# Patient Record
Sex: MALE | Race: WHITE | NOT HISPANIC OR LATINO | URBAN - METROPOLITAN AREA
[De-identification: names, ages, dates, MRNs, and addresses within clinical notes are randomized per-mention and may not be internally consistent; named-entity substitution may affect disease eponyms.]

---

## 2018-08-24 ENCOUNTER — OUTPATIENT (OUTPATIENT)
Dept: OUTPATIENT SERVICES | Facility: HOSPITAL | Age: 68
LOS: 1 days | Discharge: HOME | End: 2018-08-24

## 2018-08-24 DIAGNOSIS — R05 COUGH: ICD-10-CM

## 2020-06-26 ENCOUNTER — OUTPATIENT (OUTPATIENT)
Dept: OUTPATIENT SERVICES | Facility: HOSPITAL | Age: 70
LOS: 1 days | Discharge: HOME | End: 2020-06-26

## 2020-06-26 ENCOUNTER — APPOINTMENT (OUTPATIENT)
Dept: HEMATOLOGY ONCOLOGY | Facility: CLINIC | Age: 70
End: 2020-06-26
Payer: MEDICARE

## 2020-06-26 ENCOUNTER — LABORATORY RESULT (OUTPATIENT)
Age: 70
End: 2020-06-26

## 2020-06-26 VITALS
SYSTOLIC BLOOD PRESSURE: 127 MMHG | TEMPERATURE: 97 F | HEIGHT: 66 IN | DIASTOLIC BLOOD PRESSURE: 70 MMHG | WEIGHT: 172 LBS | RESPIRATION RATE: 18 BRPM | BODY MASS INDEX: 27.64 KG/M2 | HEART RATE: 59 BPM

## 2020-06-26 DIAGNOSIS — Z80.0 FAMILY HISTORY OF MALIGNANT NEOPLASM OF DIGESTIVE ORGANS: ICD-10-CM

## 2020-06-26 DIAGNOSIS — Z86.39 PERSONAL HISTORY OF OTHER ENDOCRINE, NUTRITIONAL AND METABOLIC DISEASE: ICD-10-CM

## 2020-06-26 DIAGNOSIS — D69.6 THROMBOCYTOPENIA, UNSPECIFIED: ICD-10-CM

## 2020-06-26 DIAGNOSIS — E11.9 TYPE 2 DIABETES MELLITUS W/OUT COMPLICATIONS: ICD-10-CM

## 2020-06-26 DIAGNOSIS — Z82.0 FAMILY HISTORY OF EPILEPSY AND OTHER DISEASES OF THE NERVOUS SYSTEM: ICD-10-CM

## 2020-06-26 DIAGNOSIS — E04.9 NONTOXIC GOITER, UNSPECIFIED: ICD-10-CM

## 2020-06-26 DIAGNOSIS — Z83.3 FAMILY HISTORY OF DIABETES MELLITUS: ICD-10-CM

## 2020-06-26 DIAGNOSIS — Z78.9 OTHER SPECIFIED HEALTH STATUS: ICD-10-CM

## 2020-06-26 DIAGNOSIS — E78.00 PURE HYPERCHOLESTEROLEMIA, UNSPECIFIED: ICD-10-CM

## 2020-06-26 DIAGNOSIS — Z80.1 FAMILY HISTORY OF MALIGNANT NEOPLASM OF TRACHEA, BRONCHUS AND LUNG: ICD-10-CM

## 2020-06-26 DIAGNOSIS — Z82.49 FAMILY HISTORY OF ISCHEMIC HEART DISEASE AND OTHER DISEASES OF THE CIRCULATORY SYSTEM: ICD-10-CM

## 2020-06-26 DIAGNOSIS — Z87.891 PERSONAL HISTORY OF NICOTINE DEPENDENCE: ICD-10-CM

## 2020-06-26 LAB
ALBUMIN SERPL ELPH-MCNC: 4.5 G/DL
ALP BLD-CCNC: 53 U/L
ALT SERPL-CCNC: 13 U/L
ANION GAP SERPL CALC-SCNC: 13 MMOL/L
AST SERPL-CCNC: 14 U/L
BILIRUB SERPL-MCNC: 0.4 MG/DL
BUN SERPL-MCNC: 13 MG/DL
CALCIUM SERPL-MCNC: 9.5 MG/DL
CHLORIDE SERPL-SCNC: 103 MMOL/L
CO2 SERPL-SCNC: 26 MMOL/L
CREAT SERPL-MCNC: 0.8 MG/DL
GLUCOSE SERPL-MCNC: 91 MG/DL
HCT VFR BLD CALC: 44.3 %
HGB BLD-MCNC: 14.6 G/DL
LDH SERPL-CCNC: 143 U/L
MCHC RBC-ENTMCNC: 29.1 PG
MCHC RBC-ENTMCNC: 33 G/DL
MCV RBC AUTO: 88.2 FL
PLATELET # BLD AUTO: 81 K/UL
PMV BLD: 11.8 FL
POTASSIUM SERPL-SCNC: 4.8 MMOL/L
PROT SERPL-MCNC: 7.2 G/DL
RBC # BLD: 5.02 M/UL
RBC # FLD: 12.4 %
SODIUM SERPL-SCNC: 142 MMOL/L
WBC # FLD AUTO: 7.81 K/UL

## 2020-06-26 PROCEDURE — 99204 OFFICE O/P NEW MOD 45 MIN: CPT

## 2020-06-29 LAB
ANA SER IF-ACNC: NEGATIVE
RHEUMATOID FACT SER QL: <10 IU/ML

## 2020-06-30 LAB
CARDIOLIPIN AB SER IA-ACNC: NEGATIVE
PHOSPHOLIPDS: 190 MG/DL

## 2020-07-01 ENCOUNTER — LABORATORY RESULT (OUTPATIENT)
Age: 70
End: 2020-07-01

## 2020-07-01 ENCOUNTER — APPOINTMENT (OUTPATIENT)
Dept: HEMATOLOGY ONCOLOGY | Facility: CLINIC | Age: 70
End: 2020-07-01

## 2020-07-01 ENCOUNTER — OUTPATIENT (OUTPATIENT)
Dept: OUTPATIENT SERVICES | Facility: HOSPITAL | Age: 70
LOS: 1 days | Discharge: HOME | End: 2020-07-01

## 2020-07-01 DIAGNOSIS — Z00.00 ENCOUNTER FOR GENERAL ADULT MEDICAL EXAMINATION W/OUT ABNORMAL FINDINGS: ICD-10-CM

## 2020-07-01 DIAGNOSIS — D69.6 THROMBOCYTOPENIA, UNSPECIFIED: ICD-10-CM

## 2020-07-01 DIAGNOSIS — E04.9 NONTOXIC GOITER, UNSPECIFIED: ICD-10-CM

## 2020-07-01 LAB
HCT VFR BLD CALC: 42.1 %
HGB BLD-MCNC: 14.2 G/DL
MCHC RBC-ENTMCNC: 29.3 PG
MCHC RBC-ENTMCNC: 33.7 G/DL
MCV RBC AUTO: 86.8 FL
PLATELET # BLD AUTO: 79 K/UL
PMV BLD: 11.6 FL
RBC # BLD: 4.85 M/UL
RBC # FLD: 12.3 %
WBC # FLD AUTO: 7.88 K/UL

## 2020-07-02 DIAGNOSIS — D69.6 THROMBOCYTOPENIA, UNSPECIFIED: ICD-10-CM

## 2020-07-02 NOTE — CONSULT LETTER
[Dear  ___] : Dear  [unfilled], [Consult Letter:] : I had the pleasure of evaluating your patient, [unfilled]. [Please see my note below.] : Please see my note below. [Consult Closing:] : Thank you very much for allowing me to participate in the care of this patient.  If you have any questions, please do not hesitate to contact me. [Sincerely,] : Sincerely, [FreeTextEntry3] : Dr. CARMEL Branch [FreeTextEntry2] : Dr. Nam Byers

## 2020-07-02 NOTE — ASSESSMENT
[FreeTextEntry1] : Josafat Thompson is a 69 year WM referred for thrombocytopenia, asymptomatic, for evaluation prior to thyroid surgery. Given his past medical history of mild thrombocytopenia and no other hematological issues, we are dealing most likely with chronic ITP. Unfortunately, that's a diagnosis of exclusion.\par \par We will check labs today to r/o underlying conditions: ESTELITA, Rheumatoid factor, LDH, antiphospholipid, anticardiolipin antibodies, CBC with peripheral smear to rule out clumping. \par \par For us hematologists, a platelet count of 89 K should achieve a good hemostasis especially in the absence of any other coagulopathy. The patient will be cleared from hematological standpoint for thyroidectomy if the work up shows clumping and if no other diagnostic abnormality. However, if surgery insists on having a platelet count of 100 K or above, a short course of steroid may be administered.\par \par Further recommendations will be discussed once the above workup is complete. \par \par Case was seen and discussed with Dr. Branch who agreed with the assessment and plan.\par \par

## 2020-07-02 NOTE — RESULTS/DATA
[FreeTextEntry1] : Patient's blood work reviewed. WBC and H/H are within normal limits. Platelets, as mentioned, 89 K, on a recent hemogram.

## 2020-07-02 NOTE — HISTORY OF PRESENT ILLNESS
[Disease:__________________________] : Disease: [unfilled] [de-identified] : Josafat Thompson, a 69 year WM, is here today, upon the recommendation of his surgeon, for initial evaluation for thrombocytopenia. \par As per the patient, he has a h/o thrombocytopenia diagnosed about 4 years ago, asymptomatic, never requiring any treatment. He had regular follow up visits with his PCP and on exam he was noted to have an enlarged thyroid. He was evaluated by his endocrinologist. TSH, free T4 were within normal.  US of the thyroid revealed an enlarged thyroid Numerous biopsies which were obtained were all benign. \par The patient was scheduled for partial thyroidectomy on Monday, June 22nd. The PAST evaluation for surgery showed a low platelet count at 89 K. He was asked to have a hematological clearance however for surgery.\par \par The patient feels well. He denies bleeding, bruising, weakness, SOB, fatigue. Denies fever, night sweats or chills.  Denies chest pain, numbness, vision changes. The appetite is good.

## 2021-02-16 ENCOUNTER — LABORATORY RESULT (OUTPATIENT)
Age: 71
End: 2021-02-16

## 2021-02-16 ENCOUNTER — APPOINTMENT (OUTPATIENT)
Dept: HEMATOLOGY ONCOLOGY | Facility: CLINIC | Age: 71
End: 2021-02-16
Payer: MEDICARE

## 2021-02-16 VITALS — HEIGHT: 66 IN | BODY MASS INDEX: 27.64 KG/M2 | WEIGHT: 172 LBS | HEART RATE: 65 BPM

## 2021-02-16 LAB
HCT VFR BLD CALC: 39.2 %
HGB BLD-MCNC: 13.4 G/DL
MCHC RBC-ENTMCNC: 30.3 PG
MCHC RBC-ENTMCNC: 34.2 G/DL
MCV RBC AUTO: 88.7 FL
PLATELET # BLD AUTO: 64 K/UL
PMV BLD: 11.7 FL
RBC # BLD: 4.42 M/UL
RBC # FLD: 12.9 %
WBC # FLD AUTO: 7.62 K/UL

## 2021-02-16 PROCEDURE — 99213 OFFICE O/P EST LOW 20 MIN: CPT

## 2021-02-16 NOTE — ASSESSMENT
[FreeTextEntry1] : 1. Thrombocytopenia, asymptomatic, probably immune but bone marrow disorders cannot be ruled out.\par 2. Mild anemia, recent. That may also be part of the underlying process of #1 problem above or independent.\par \par The situation was discussed with the patient on the phone since he left, as recommended, after the blood was drawn. In addition to the CBC drawn today, we will also obtain a CMP.\par He will be monitored closely. The CBC will be repeated in a month. At that time, we will also order antiphospholipid antibodies since initially the lab did phospholipids instead of the antibodies. Previously, his work up was otherwise negative including anticardiolipin antibodies, ESTELITA, RF.\par Eventually, if those drops continue, he will need bone marrow studies.\par The patient was explained the reasons for the BM aspiration and biopsy.\par \par All his questions answered.

## 2021-02-16 NOTE — REASON FOR VISIT
[Follow-Up Visit] : a follow-up visit for [FreeTextEntry2] : Thrombocytopenia, lump at the left anterior axillary area

## 2021-02-16 NOTE — CONSULT LETTER
[Dear  ___] : Dear  [unfilled], [Courtesy Letter:] : I had the pleasure of seeing your patient, [unfilled], in my office today. [Please see my note below.] : Please see my note below. [Consult Closing:] : Thank you very much for allowing me to participate in the care of this patient.  If you have any questions, please do not hesitate to contact me. [Sincerely,] : Sincerely, [FreeTextEntry2] : Dr. Nam Byers [FreeTextEntry3] : Dr. CARMEL Branch

## 2021-02-16 NOTE — PHYSICAL EXAM
[Fully active, able to carry on all pre-disease performance without restriction] : Status 0 - Fully active, able to carry on all pre-disease performance without restriction [Normal] : affect appropriate [de-identified] : Anterior neck fullness [de-identified] : A soft tissue mass at the left axillary anterior line, movable, measuring may be 6-7 cm in the largest dimension, mostly flat with no firmer lesions felt. [de-identified] : Arthritic changes noted.

## 2021-02-16 NOTE — HISTORY OF PRESENT ILLNESS
[Disease:__________________________] : Disease: [unfilled] [de-identified] : The patient is coming for his regularly scheduled follow up for for his thrombocytopenia and a new mass at the left axillary are, on the anterior axillary line.\par The patient was in his usual state of health when he felt the mass himself. He had not paid attention to it until recently; therefore, it's not clear how long he had it.\par Otherwise, he is denying any fever or night sweats. No bleeding or bruising. No cough or shortness of breath. No new headache or dizziness.\par Eventually he had his thyroid biopsy and he was told that it was benign and no intervention required at this time.

## 2021-02-17 LAB
ALBUMIN SERPL ELPH-MCNC: 4 G/DL
ALP BLD-CCNC: 52 U/L
ALT SERPL-CCNC: 16 U/L
ANION GAP SERPL CALC-SCNC: 12 MMOL/L
AST SERPL-CCNC: 17 U/L
BILIRUB SERPL-MCNC: 0.2 MG/DL
BUN SERPL-MCNC: 14 MG/DL
CALCIUM SERPL-MCNC: 9 MG/DL
CHLORIDE SERPL-SCNC: 103 MMOL/L
CO2 SERPL-SCNC: 26 MMOL/L
CREAT SERPL-MCNC: 0.6 MG/DL
GLUCOSE SERPL-MCNC: 179 MG/DL
POTASSIUM SERPL-SCNC: 4.8 MMOL/L
PROT SERPL-MCNC: 6.7 G/DL
SODIUM SERPL-SCNC: 141 MMOL/L

## 2021-03-15 ENCOUNTER — LABORATORY RESULT (OUTPATIENT)
Age: 71
End: 2021-03-15

## 2021-03-15 ENCOUNTER — APPOINTMENT (OUTPATIENT)
Dept: HEMATOLOGY ONCOLOGY | Facility: CLINIC | Age: 71
End: 2021-03-15
Payer: MEDICARE

## 2021-03-15 ENCOUNTER — OUTPATIENT (OUTPATIENT)
Dept: OUTPATIENT SERVICES | Facility: HOSPITAL | Age: 71
LOS: 1 days | Discharge: HOME | End: 2021-03-15

## 2021-03-15 VITALS
HEIGHT: 66 IN | SYSTOLIC BLOOD PRESSURE: 140 MMHG | DIASTOLIC BLOOD PRESSURE: 67 MMHG | HEART RATE: 65 BPM | RESPIRATION RATE: 14 BRPM | BODY MASS INDEX: 27.97 KG/M2 | WEIGHT: 174 LBS | TEMPERATURE: 97.3 F

## 2021-03-15 DIAGNOSIS — D64.9 ANEMIA, UNSPECIFIED: ICD-10-CM

## 2021-03-15 DIAGNOSIS — D69.6 THROMBOCYTOPENIA, UNSPECIFIED: ICD-10-CM

## 2021-03-15 LAB
HCT VFR BLD CALC: 42.2 %
HGB BLD-MCNC: 14.4 G/DL
MCHC RBC-ENTMCNC: 29.8 PG
MCHC RBC-ENTMCNC: 34.1 G/DL
MCV RBC AUTO: 87.2 FL
PLATELET # BLD AUTO: 105 K/UL
PMV BLD: 10.8 FL
RBC # BLD: 4.84 M/UL
RBC # FLD: 13 %
WBC # FLD AUTO: 9.06 K/UL

## 2021-03-15 PROCEDURE — 99213 OFFICE O/P EST LOW 20 MIN: CPT

## 2021-03-16 LAB
ALBUMIN SERPL ELPH-MCNC: 4.4 G/DL
ALP BLD-CCNC: 52 U/L
ALT SERPL-CCNC: 13 U/L
ANION GAP SERPL CALC-SCNC: 9 MMOL/L
AST SERPL-CCNC: 16 U/L
BILIRUB SERPL-MCNC: 0.2 MG/DL
BUN SERPL-MCNC: 13 MG/DL
CALCIUM SERPL-MCNC: 9.7 MG/DL
CARDIOLIPIN AB SER IA-ACNC: NEGATIVE
CHLORIDE SERPL-SCNC: 103 MMOL/L
CO2 SERPL-SCNC: 27 MMOL/L
CREAT SERPL-MCNC: 0.7 MG/DL
GLUCOSE SERPL-MCNC: 89 MG/DL
LDH SERPL-CCNC: 167 U/L
POTASSIUM SERPL-SCNC: 5.1 MMOL/L
PROT SERPL-MCNC: 7 G/DL
SODIUM SERPL-SCNC: 139 MMOL/L

## 2021-03-16 NOTE — HISTORY OF PRESENT ILLNESS
[de-identified] : The patient is coming for a follow up for his thrombocytopenia thought to be immune since his work up was essentially negative.\par The patient is denying any new particular complaints.\par The slight soft protuberance at the left anterior axillary line has been stable. It had the characteristics of a lipoma.\par The patient is denying any bleeding or bruising.\par He is on no new medication.\par His thyroid biopsy has shown just a benign pathology.

## 2021-03-16 NOTE — PHYSICAL EXAM
[Fully active, able to carry on all pre-disease performance without restriction] : Status 0 - Fully active, able to carry on all pre-disease performance without restriction [Normal] : affect appropriate [de-identified] : Soft tissue preponderance at the left anterior axillary line is unchanged.

## 2021-03-16 NOTE — ASSESSMENT
[FreeTextEntry1] : 1. Thrombocytopenia, possibly immune, improved compared to February.\par 2. 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 Will observe.\par \par The situation was discussed with the patient. \par At this time will continue to observe without any "therapeutic" intervention but also will draw antiphospholipid antibodies since at his initial visit only phospholipids were drawn instead.\par \par All the above were discussed with the patient.\par

## 2021-03-17 LAB
B2 GLYCOPROT1 IGA SERPL IA-ACNC: <5 SAU
B2 GLYCOPROT1 IGG SER-ACNC: <5 SGU
B2 GLYCOPROT1 IGM SER-ACNC: 6.2 SMU
CARDIOLIPIN IGM SER-MCNC: 5 GPL
CARDIOLIPIN IGM SER-MCNC: 9.2 MPL

## 2021-03-19 LAB
PS IGA SER QL: 1
PS IGG SER QL: 5
PS IGM SER QL: 10

## 2021-03-23 DIAGNOSIS — D17.22 BENIGN LIPOMATOUS NEOPLASM OF SKIN AND SUBCUTANEOUS TISSUE OF LEFT ARM: ICD-10-CM

## 2022-07-13 NOTE — PHYSICAL EXAM
[Fully active, able to carry on all pre-disease performance without restriction] : Status 0 - Fully active, able to carry on all pre-disease performance without restriction [Normal] : pharynx is unremarkable, moist mucus membrane, no oral lesions [de-identified] : Enlarged thyroid almost diffusely, right lobe may be slightly more than the left. [de-identified] : n Cyclosporine Pregnancy And Lactation Text: This medication is Pregnancy Category C and it isn't know if it is safe during pregnancy. This medication is excreted in breast milk.

## 2023-06-06 ENCOUNTER — EMERGENCY (EMERGENCY)
Facility: HOSPITAL | Age: 73
LOS: 0 days | Discharge: HOME | End: 2023-06-06
Attending: EMERGENCY MEDICINE
Payer: MEDICARE

## 2023-06-06 VITALS
RESPIRATION RATE: 18 BRPM | HEART RATE: 62 BPM | DIASTOLIC BLOOD PRESSURE: 95 MMHG | SYSTOLIC BLOOD PRESSURE: 154 MMHG | HEIGHT: 68 IN | OXYGEN SATURATION: 98 % | WEIGHT: 169.98 LBS | TEMPERATURE: 97 F

## 2023-06-06 DIAGNOSIS — R91.1 SOLITARY PULMONARY NODULE: ICD-10-CM

## 2023-06-06 DIAGNOSIS — E11.9 TYPE 2 DIABETES MELLITUS WITHOUT COMPLICATIONS: ICD-10-CM

## 2023-06-06 DIAGNOSIS — R06.02 SHORTNESS OF BREATH: ICD-10-CM

## 2023-06-06 DIAGNOSIS — R07.89 OTHER CHEST PAIN: ICD-10-CM

## 2023-06-06 DIAGNOSIS — Z87.891 PERSONAL HISTORY OF NICOTINE DEPENDENCE: ICD-10-CM

## 2023-06-06 LAB
ALBUMIN SERPL ELPH-MCNC: 4.6 G/DL — SIGNIFICANT CHANGE UP (ref 3.5–5.2)
ALP SERPL-CCNC: 57 U/L — SIGNIFICANT CHANGE UP (ref 30–115)
ALT FLD-CCNC: 17 U/L — SIGNIFICANT CHANGE UP (ref 0–41)
ANION GAP SERPL CALC-SCNC: 11 MMOL/L — SIGNIFICANT CHANGE UP (ref 7–14)
AST SERPL-CCNC: 16 U/L — SIGNIFICANT CHANGE UP (ref 0–41)
BASOPHILS # BLD AUTO: 0.06 K/UL — SIGNIFICANT CHANGE UP (ref 0–0.2)
BASOPHILS NFR BLD AUTO: 0.7 % — SIGNIFICANT CHANGE UP (ref 0–1)
BILIRUB SERPL-MCNC: 0.3 MG/DL — SIGNIFICANT CHANGE UP (ref 0.2–1.2)
BUN SERPL-MCNC: 12 MG/DL — SIGNIFICANT CHANGE UP (ref 10–20)
CALCIUM SERPL-MCNC: 9.9 MG/DL — SIGNIFICANT CHANGE UP (ref 8.4–10.5)
CHLORIDE SERPL-SCNC: 105 MMOL/L — SIGNIFICANT CHANGE UP (ref 98–110)
CO2 SERPL-SCNC: 26 MMOL/L — SIGNIFICANT CHANGE UP (ref 17–32)
CREAT SERPL-MCNC: 0.7 MG/DL — SIGNIFICANT CHANGE UP (ref 0.7–1.5)
D DIMER BLD IA.RAPID-MCNC: 1020 NG/ML DDU — HIGH
EGFR: 97 ML/MIN/1.73M2 — SIGNIFICANT CHANGE UP
EOSINOPHIL # BLD AUTO: 0.17 K/UL — SIGNIFICANT CHANGE UP (ref 0–0.7)
EOSINOPHIL NFR BLD AUTO: 1.9 % — SIGNIFICANT CHANGE UP (ref 0–8)
GLUCOSE SERPL-MCNC: 111 MG/DL — HIGH (ref 70–99)
HCT VFR BLD CALC: 42.8 % — SIGNIFICANT CHANGE UP (ref 42–52)
HGB BLD-MCNC: 14.8 G/DL — SIGNIFICANT CHANGE UP (ref 14–18)
IMM GRANULOCYTES NFR BLD AUTO: 0.2 % — SIGNIFICANT CHANGE UP (ref 0.1–0.3)
LIDOCAIN IGE QN: 35 U/L — SIGNIFICANT CHANGE UP (ref 7–60)
LYMPHOCYTES # BLD AUTO: 1.71 K/UL — SIGNIFICANT CHANGE UP (ref 1.2–3.4)
LYMPHOCYTES # BLD AUTO: 19.3 % — LOW (ref 20.5–51.1)
MCHC RBC-ENTMCNC: 29.8 PG — SIGNIFICANT CHANGE UP (ref 27–31)
MCHC RBC-ENTMCNC: 34.6 G/DL — SIGNIFICANT CHANGE UP (ref 32–37)
MCV RBC AUTO: 86.1 FL — SIGNIFICANT CHANGE UP (ref 80–94)
MONOCYTES # BLD AUTO: 0.63 K/UL — HIGH (ref 0.1–0.6)
MONOCYTES NFR BLD AUTO: 7.1 % — SIGNIFICANT CHANGE UP (ref 1.7–9.3)
NEUTROPHILS # BLD AUTO: 6.25 K/UL — SIGNIFICANT CHANGE UP (ref 1.4–6.5)
NEUTROPHILS NFR BLD AUTO: 70.8 % — SIGNIFICANT CHANGE UP (ref 42.2–75.2)
NRBC # BLD: 0 /100 WBCS — SIGNIFICANT CHANGE UP (ref 0–0)
PLATELET # BLD AUTO: 96 K/UL — LOW (ref 130–400)
PMV BLD: 11.5 FL — HIGH (ref 7.4–10.4)
POTASSIUM SERPL-MCNC: 4.5 MMOL/L — SIGNIFICANT CHANGE UP (ref 3.5–5)
POTASSIUM SERPL-SCNC: 4.5 MMOL/L — SIGNIFICANT CHANGE UP (ref 3.5–5)
PROT SERPL-MCNC: 7.3 G/DL — SIGNIFICANT CHANGE UP (ref 6–8)
RBC # BLD: 4.97 M/UL — SIGNIFICANT CHANGE UP (ref 4.7–6.1)
RBC # FLD: 12.2 % — SIGNIFICANT CHANGE UP (ref 11.5–14.5)
SODIUM SERPL-SCNC: 142 MMOL/L — SIGNIFICANT CHANGE UP (ref 135–146)
TROPONIN T SERPL-MCNC: <0.01 NG/ML — SIGNIFICANT CHANGE UP
TROPONIN T SERPL-MCNC: <0.01 NG/ML — SIGNIFICANT CHANGE UP
WBC # BLD: 8.84 K/UL — SIGNIFICANT CHANGE UP (ref 4.8–10.8)
WBC # FLD AUTO: 8.84 K/UL — SIGNIFICANT CHANGE UP (ref 4.8–10.8)

## 2023-06-06 PROCEDURE — 83690 ASSAY OF LIPASE: CPT

## 2023-06-06 PROCEDURE — 71045 X-RAY EXAM CHEST 1 VIEW: CPT | Mod: 26

## 2023-06-06 PROCEDURE — 71275 CT ANGIOGRAPHY CHEST: CPT | Mod: MA

## 2023-06-06 PROCEDURE — 71045 X-RAY EXAM CHEST 1 VIEW: CPT

## 2023-06-06 PROCEDURE — 80053 COMPREHEN METABOLIC PANEL: CPT

## 2023-06-06 PROCEDURE — 36415 COLL VENOUS BLD VENIPUNCTURE: CPT

## 2023-06-06 PROCEDURE — 93005 ELECTROCARDIOGRAM TRACING: CPT

## 2023-06-06 PROCEDURE — 84484 ASSAY OF TROPONIN QUANT: CPT | Mod: 59

## 2023-06-06 PROCEDURE — 99285 EMERGENCY DEPT VISIT HI MDM: CPT | Mod: FS

## 2023-06-06 PROCEDURE — 71275 CT ANGIOGRAPHY CHEST: CPT | Mod: 26,MA

## 2023-06-06 PROCEDURE — 93010 ELECTROCARDIOGRAM REPORT: CPT

## 2023-06-06 PROCEDURE — 85379 FIBRIN DEGRADATION QUANT: CPT

## 2023-06-06 PROCEDURE — 99285 EMERGENCY DEPT VISIT HI MDM: CPT | Mod: 25

## 2023-06-06 PROCEDURE — 85025 COMPLETE CBC W/AUTO DIFF WBC: CPT

## 2023-06-06 NOTE — ED ADULT TRIAGE NOTE - CHIEF COMPLAINT QUOTE
Pt states " I am having cheat pain and shortness of breath that started months ago. Today its lasting longer than usual"

## 2023-06-06 NOTE — ED PROVIDER NOTE - ATTENDING APP SHARED VISIT CONTRIBUTION OF CARE
Patient past medical history of diabetes here with right-sided chest pressure associated shortness of breath.  Patient feels like he cannot catch his breath.  No fevers no chills.  No abdominal pain.  No vomiting.  No diaphoresis.  No other complaints.  Patient reports he has had the symptoms on and off for the past few months but today symptoms were worse than usual and did not go away.  No other complaints.  Patient is awake alert.  Abdomen soft nontender.  Patient breathing comfortably.  No calf tenderness.  Plan: Labs, EKG, chest x-ray, reassess.

## 2023-06-06 NOTE — ED ADULT NURSE NOTE - OBJECTIVE STATEMENT
patient states he's been experiencing chest discomfort and shortness of breath for months, today it is lasting longer than usual.

## 2023-06-06 NOTE — ED PROVIDER NOTE - OBJECTIVE STATEMENT
71 y/o male with hx of NIDDM, former smoker presents to the ED for evaluation of right sided chest pain and sob over past few months. no weight loss, hemoptysis. no back pain . no neck pain . no rashes. patient denies any calf pain or hemoptysis

## 2023-06-06 NOTE — ED ADULT NURSE NOTE - NSFALLUNIVINTERV_ED_ALL_ED
Bed/Stretcher in lowest position, wheels locked, appropriate side rails in place/Call bell, personal items and telephone in reach/Instruct patient to call for assistance before getting out of bed/chair/stretcher/Non-slip footwear applied when patient is off stretcher/[place] to call system/Physically safe environment - no spills, clutter or unnecessary equipment/Purposeful proactive rounding/Room/bathroom lighting operational, light cord in reach

## 2023-06-06 NOTE — ED PROVIDER NOTE - NSFOLLOWUPINSTRUCTIONS_ED_ALL_ED_FT
Our Emergency Department Referral Coordinators will be reaching out to you in the next 24-48 hours from 9:00am to 5:00pm with a follow up appointment. Please expect a phone call from the hospital in that time frame. If you do not receive a call or if you have any questions or concerns, you can reach them at   [phone]        Chest Pain    Chest pain can be caused by many different conditions which may or may not be dangerous. Causes include heartburn, lung infections, heart attack, blood clot in lungs, skin infections, strain or damage to muscle, cartilage, or bones, etc. In addition to a history and physical examination, an electrocardiogram (ECG) or other lab tests may have been performed to determine the cause of your chest pain. Follow up with your primary care provider or with a cardiologist as instructed.     SEEK IMMEDIATE MEDICAL CARE IF YOU HAVE ANY OF THE FOLLOWING SYMPTOMS: worsening chest pain, coughing up blood, unexplained back/neck/jaw pain, severe abdominal pain, dizziness or lightheadedness, fainting, shortness of breath, sweaty or clammy skin, vomiting, or racing heart beat. These symptoms may represent a serious problem that is an emergency. Do not wait to see if the symptoms will go away. Get medical help right away. Call 911 and do not drive yourself to the hospital.

## 2023-06-06 NOTE — ED PROVIDER NOTE - PROGRESS NOTE DETAILS
I informed patient of pulmonary nodule on CT and need for follow-up with pulmonary.  Patient also follow-up with cardiology as outpatient.  Patient comfortable with plan.  Patient with symptoms for the past few months.  H&P not consistent with acute coronary syndrome. I called patient to follow-up.  Patient reports he is feeling better today.  Symptoms are improving.  I also informed patient of his platelet number.  Patient reports that he already follows up with a hematologist and is aware of the fact that he has low platelets.  Patient is going to follow-up with cardiology and pulmonology.

## 2023-06-06 NOTE — ED PROVIDER NOTE - BIRTH SEX
Per verbal order read back by Dr. Odonnell patient can take ibuprofen 600 mg ibuprofen every 6 hours for pain.    Parvin Vinson RN on 3/29/2022 at 4:24 PM     Male

## 2023-06-06 NOTE — ED PROVIDER NOTE - PHYSICAL EXAMINATION
Vital Signs: I have reviewed the initial vital signs.  Constitutional: well-nourished, no acute distress, normocephalic  Eyes: PERRLA, EOMI, clear conjunctiva  ENT: MMM  Cardiovascular: regular rate, regular rhythm, no murmur appreciated  Respiratory: unlabored respiratory effort, clear to auscultation bilaterally, no chest wall tenderness  Gastrointestinal: soft, non-tender, non-distended  abdomen, no pulsatile mass  Musculoskeletal: supple neck, no lower extremity edema, no bony tenderness  Integumentary: warm, dry, no rash  Neurologic: awake, alert, cranial nerves II-XII grossly intact, extremities’ motor and sensory functions grossly intact, no focal deficits,

## 2023-06-06 NOTE — ED PROVIDER NOTE - PATIENT PORTAL LINK FT
You can access the FollowMyHealth Patient Portal offered by [facility] by registering at the following website: [email or web address]. By joining [employer]’s FollowMyHealth portal, you will also be able to view your health information using other applications (apps) compatible with our system.

## 2023-06-06 NOTE — ED PROVIDER NOTE - CLINICAL SUMMARY MEDICAL DECISION MAKING FREE TEXT BOX
72-year-old male presents with right-sided chest discomfort for many months.  Patient reports symptoms associated with shortness of breath.  No fevers no chills.  No nausea or vomiting.  No diaphoresis.  No abdominal pain.  No back pain.  Pain is nonradiating.  No other complaints.  Patient is nontoxic well-appearing in the ED.  Labs and imaging reviewed.  CTA negative for PE.  Troponin negative x2.  EKG reviewed.  Patient has had symptoms for many months.  Patient comfortable with follow-up as an outpatient.  Patient and importance of follow-up with cardiology.  I also informed patient of CT finding of pulmonary nodule and need to follow-up with pulmonary for this.  Patient understood.  Patient will follow-up with cardiology and pulmonology.

## 2023-11-24 ENCOUNTER — EMERGENCY (EMERGENCY)
Facility: HOSPITAL | Age: 73
LOS: 0 days | Discharge: HOME | End: 2023-11-24
Attending: EMERGENCY MEDICINE
Payer: MEDICARE

## 2023-11-24 VITALS
HEART RATE: 83 BPM | DIASTOLIC BLOOD PRESSURE: 77 MMHG | OXYGEN SATURATION: 98 % | RESPIRATION RATE: 18 BRPM | SYSTOLIC BLOOD PRESSURE: 159 MMHG | TEMPERATURE: 98 F | WEIGHT: 175.05 LBS

## 2023-11-24 DIAGNOSIS — S90.01XA CONTUSION OF RIGHT ANKLE, INITIAL ENCOUNTER: ICD-10-CM

## 2023-11-24 DIAGNOSIS — M25.571 PAIN IN RIGHT ANKLE AND JOINTS OF RIGHT FOOT: ICD-10-CM
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
## 2023-11-24 PROCEDURE — 29505 APPLICATION LONG LEG SPLINT: CPT | Mod: RT

## 2023-11-24 PROCEDURE — 73630 X-RAY EXAM OF FOOT: CPT | Mod: 26,RT

## 2023-11-24 PROCEDURE — 73610 X-RAY EXAM OF ANKLE: CPT | Mod: 26,RT

## 2023-11-24 PROCEDURE — 99284 EMERGENCY DEPT VISIT MOD MDM: CPT | Mod: 25

## 2023-11-24 PROCEDURE — 99284 EMERGENCY DEPT VISIT MOD MDM: CPT | Mod: FS,25

## 2023-11-24 PROCEDURE — 29515 APPLICATION SHORT LEG SPLINT: CPT | Mod: RT

## 2023-11-24 PROCEDURE — 73630 X-RAY EXAM OF FOOT: CPT | Mod: RT

## 2023-11-24 PROCEDURE — 73610 X-RAY EXAM OF ANKLE: CPT | Mod: RT

## 2023-11-24 NOTE — ED PROVIDER NOTE - PHYSICAL EXAMINATION
Physical Exam    Vital Signs: I have reviewed the initial vital signs.  Constitutional: appears stated age, no acute distress  Eyes: Sclera clear, EOMI.  Cardiovascular: S1 and S2, regular rate, regular rhythm, well-perfused extremities, radial pulses equal and 2+, pedal pulses 2+ and equal  Respiratory: unlabored respiratory effort, clear to auscultation bilaterally no wheezing, rales, or rhonchi  Gastrointestinal:  abdomen soft, non-tender, no pulsatile mass, bowel sounds within normal limits  Musculoskeletal: supple neck, mild tenderness to right medial malleolus, no tenderness to palpation to right fibular head, right lateral malleolus, right calcaneus/heel, right 5th metatarsal. full range of motion right ankle, right knee, right hip.   Integumentary: warm, dry, no rash  Neurologic: awake, alert, oriented x3, extremities’ motor and sensory functions grossly intact

## 2023-11-24 NOTE — ED PROVIDER NOTE - ATTENDING APP SHARED VISIT CONTRIBUTION OF CARE
73-year-old male presents for evaluation of injury to right ankle sustained earlier today.  Patient states he was on a ladder hanging up [place] lights when the ladder went down causing him to fall landing on his feet.  Patient states he was able to finishing the [place] lights however pain persisted and is worse with weightbearing.  Patient applied compressive wrap and came to the ED.  No head injury, no neck or back pain.  On exam patient in NAD, AAOx3, GCS 15, no midline vertebral tenderness, knee nontender, hips nontender, no fibular head tenderness, positive swelling to right ankle with ecchymosis to the medial aspect, no fifth metatarsal tenderness, skin is intact, positive DP pulses

## 2023-11-24 NOTE — ED PROVIDER NOTE - CLINICAL SUMMARY MEDICAL DECISION MAKING FREE TEXT BOX
X-ray obtained and interpreted by me.  No acute fracture or dislocation seen.  Results were discussed with patient.  Splint applied.  Recommend RICE.  Patient will need to follow-up with orthopedics.

## 2023-11-24 NOTE — ED PROVIDER NOTE - NSFOLLOWUPINSTRUCTIONS_ED_ALL_ED_FT
Our Emergency Department Referral Coordinators will be reaching out to you in the next 24-48 hours from 9:00am to 5:00pm with a follow up appointment. Please expect a phone call from the hospital in that time frame. If you do not receive a call or if you have any questions or concerns, you can reach them at   [phone].    Ankle Pain  The ankle joint holds your body weight and allows you to move around. Ankle pain can occur on either side or the back of one ankle or both ankles. Ankle pain may be sharp and burning or dull and aching. There may be tenderness, stiffness, redness, or warmth around the ankle. Many things can cause ankle pain, including an injury to the area and overuse of the ankle.    Follow these instructions at home:  Activity    Rest your ankle as told by your health care provider. Avoid any activities that cause ankle pain.  Do not use the injured limb to support your body weight until your health care provider says that you can. Use crutches as told by your health care provider.  Do exercises as told by your health care provider.  Ask your health care provider when it is safe to drive if you have a brace on your ankle.  If you have a brace:    Wear the brace as told by your health care provider. Remove it only as told by your health care provider.  Loosen the brace if your toes tingle, become numb, or turn cold and blue.  Keep the brace clean.  If the brace is not waterproof:  Do not let it get wet.  Cover it with a watertight covering when you take a bath or shower.  If you were given an elastic bandage:    A person wrapping a bandage around an ankle.  Remove it when you take a bath or a shower.  Try not to move your ankle very much, but wiggle your toes from time to time. This helps to prevent swelling.  Adjust the bandage to make it more comfortable if it feels too tight.  Loosen the bandage if you have numbness or tingling in your foot or if your foot turns cold and blue.  Managing pain, stiffness, and swelling    Bag of ice on a towel on the skin.  If directed, put ice on the painful area.  If you have a removable brace or elastic bandage, remove it as told by your health care provider.  Put ice in a plastic bag.  Place a towel between your skin and the bag.  Leave the ice on for 20 minutes, 2–3 times a day.  Move your toes often to avoid stiffness and to lessen swelling.  Raise (elevate) your ankle above the level of your heart while you are sitting or lying down.  General instructions    Record information about your pain. Writing down the following may be helpful for you and your health care provider:  How often you have ankle pain.  Where the pain is located.  What the pain feels like.  If treatment involves wearing a prescribed shoe or insole, make sure you wear it correctly and for as long as told by your health care provider.  Take over-the-counter and prescription medicines only as told by your health care provider.  Keep all follow-up visits as told by your health care provider. This is important.  Contact a health care provider if:  Your pain gets worse.  Your pain is not relieved with medicines.  You have a fever or chills.  You are having more trouble with walking.  You have new symptoms.  Get help right away if:  Your foot, leg, toes, or ankle:  Tingles or becomes numb.  Becomes swollen.  Turns pale or blue.  Summary  Ankle pain can occur on either side or the back of one ankle or both ankles.  Ankle pain may be sharp and burning or dull and aching.  Rest your ankle as told by your health care provider. If told, apply ice to the area.  Take over-the-counter and prescription medicines only as told by your health care provider.

## 2023-11-24 NOTE — ED PROVIDER NOTE - PATIENT PORTAL LINK FT
You can access the FollowMyHealth Patient Portal offered by [facility] by registering at the following website: [email or web address]. By joining [employer] [facility]’s FollowMyHealth portal, you will also be able to view your health information using other applications (apps) compatible with our system.

## 2023-11-24 NOTE — ED PROVIDER NOTE - OBJECTIVE STATEMENT
73-year-old male denies significant medical history presents with complaint of right ankle pain.  Reports he was working on a ladder with protective work boots when he slipped and fell down several steps, landing on bilateral feet.  Endorses subsequent mild pain to right ankle medial aspect. States he was able to bear weight on bilateral lower extremities after the injury, but pain increased with weightbearing. Denies other injuries/head trauma, chest pain, shortness of breath, abdominal pain, numbness or  tingling, lightheadedness, weakness.

## 2023-11-25 ENCOUNTER — APPOINTMENT (OUTPATIENT)
Dept: ORTHOPEDIC SURGERY | Facility: CLINIC | Age: 73
End: 2023-11-25
Payer: MEDICARE

## 2023-11-25 VITALS — BODY MASS INDEX: 25.76 KG/M2 | HEIGHT: 68 IN | WEIGHT: 170 LBS

## 2023-11-25 DIAGNOSIS — S93.401A SPRAIN OF UNSPECIFIED LIGAMENT OF RIGHT ANKLE, INITIAL ENCOUNTER: ICD-10-CM

## 2023-11-25 PROBLEM — E11.9 TYPE 2 DIABETES MELLITUS WITHOUT COMPLICATIONS: Chronic | Status: ACTIVE | Noted: 2023-06-06

## 2023-11-25 PROCEDURE — 99203 OFFICE O/P NEW LOW 30 MIN: CPT | Mod: 25

## 2023-11-25 PROCEDURE — L4361: CPT | Mod: KX,RT

## 2023-12-14 ENCOUNTER — APPOINTMENT (OUTPATIENT)
Dept: ORTHOPEDIC SURGERY | Facility: CLINIC | Age: 73
End: 2023-12-14

## 2024-04-25 ENCOUNTER — APPOINTMENT (OUTPATIENT)
Dept: SURGERY | Facility: CLINIC | Age: 74
End: 2024-04-25
Payer: MEDICARE

## 2024-04-25 VITALS — WEIGHT: 170 LBS | HEIGHT: 68 IN | BODY MASS INDEX: 25.76 KG/M2

## 2024-04-25 DIAGNOSIS — S76.211A STRAIN OF ADDUCTOR MUSCLE, FASCIA AND TENDON OF RIGHT THIGH, INITIAL ENCOUNTER: ICD-10-CM

## 2024-04-25 PROCEDURE — 99204 OFFICE O/P NEW MOD 45 MIN: CPT

## 2024-04-25 PROCEDURE — 99214 OFFICE O/P EST MOD 30 MIN: CPT

## 2024-04-29 NOTE — CONSULT LETTER
[Dear  ___] : Dear  [unfilled], [Courtesy Letter:] : I had the pleasure of seeing your patient, [unfilled], in my office today. [Please see my note below.] : Please see my note below. [Consult Closing:] : Thank you very much for allowing me to participate in the care of this patient.  If you have any questions, please do not hesitate to contact me. [Sincerely,] : Sincerely, [FreeTextEntry3] :     Josie Gustafson PA-C, MSPAS

## 2024-04-29 NOTE — PHYSICAL EXAM
[Respiratory Effort] : normal respiratory effort [No Rash or Lesion] : No rash or lesion [Alert] : alert [Calm] : calm [JVD] : no jugular venous distention  [de-identified] : healthy [de-identified] : normal [de-identified] : mildly protuberant abdomen [de-identified] : incarcerated umbilical hernia, no inguinal hernia recurrence

## 2024-04-29 NOTE — ASSESSMENT
[FreeTextEntry1] : Josafat is a pleasant 73 year old male with past medical history significant for HTN, hypercholesterolemia, diabetes (stated recent A1C 6.5), enlarged thyroid s/p recent biopsy pending results, and a bilateral inguinal hernia repairs with Dr. Colin in 2015 and 2016 who presents to the office complaining of 2 weeks of right groin discomfort several centimeters above his incision.  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  Physical examination demonstrates a well-healed scar with no evidence of hernia recurrence or delayed wound complications in the left inguinal region. Physical examination of the right inguinal region demonstrates some mild tenderness to deep palpation overlying the inguinal canal with no evidence of hernia recurrence or fascial defect. There is no evidence of muscular hematoma or any significant abdominal pathology. His abdomen is soft and nontender with no signs of peritonitis.  His umbilical examination demonstrates a walnut sized slightly tender bulge which is only partially reducible, consistent with an incarcerated umbilical hernia warranting surgical repair.  His current BMI is 26.  Josafat was counseled and reassured. In regard to his right groin discomfort, I believe he sustained a significant muscle strain due to overexertion and I recommended alternating ice/heat therapy and nonsteroidal anti-inflammatory medication for the next few weeks. In regard to his umbilical hernia, I explained the pros and cons of surgery, as well as all risks, benefits, indications and alternatives of the procedure and the patient understood and agreed. Josafat was scheduled for the repair of his incarcerated umbilical hernia with mesh on Monday, Adilia 3, 2024, under LOCAL with IV SEDATION at [facility] with presurgical testing waived. He was encouraged to avoid heavy lifting and strenuous activity in the interim, of course.  Niacinamide Pregnancy And Lactation Text: These medications are considered safe during pregnancy.

## 2024-05-30 ENCOUNTER — NON-APPOINTMENT (OUTPATIENT)
Age: 74
End: 2024-05-30

## 2024-05-31 NOTE — ASU PATIENT PROFILE, ADULT - AS SC BRADEN SENSORY
(4) no impairment Tremfya Counseling: I discussed with the patient the risks of guselkumab including but not limited to immunosuppression, serious infections, worsening of inflammatory bowel disease and drug reactions.  The patient understands that monitoring is required including a PPD at baseline and must alert us or the primary physician if symptoms of infection or other concerning signs are noted.

## 2024-06-03 ENCOUNTER — TRANSCRIPTION ENCOUNTER (OUTPATIENT)
Age: 74
End: 2024-06-03

## 2024-06-03 ENCOUNTER — OUTPATIENT (OUTPATIENT)
Dept: OUTPATIENT SERVICES | Facility: HOSPITAL | Age: 74
LOS: 1 days | Discharge: HOME | End: 2024-06-03
Payer: MEDICARE

## 2024-06-03 ENCOUNTER — APPOINTMENT (OUTPATIENT)
Dept: SURGERY | Facility: AMBULATORY SURGERY CENTER | Age: 74
End: 2024-06-03
Payer: MEDICARE

## 2024-06-03 VITALS
TEMPERATURE: 98 F | RESPIRATION RATE: 20 BRPM | WEIGHT: 169.98 LBS | HEIGHT: 68 IN | OXYGEN SATURATION: 99 % | HEART RATE: 49 BPM | DIASTOLIC BLOOD PRESSURE: 68 MMHG | SYSTOLIC BLOOD PRESSURE: 156 MMHG

## 2024-06-03 VITALS
RESPIRATION RATE: 18 BRPM | SYSTOLIC BLOOD PRESSURE: 162 MMHG | OXYGEN SATURATION: 99 % | DIASTOLIC BLOOD PRESSURE: 82 MMHG | HEART RATE: 49 BPM

## 2024-06-03 DIAGNOSIS — K42.0 UMBILICAL HERNIA WITH OBSTRUCTION, WITHOUT GANGRENE: ICD-10-CM

## 2024-06-03 DIAGNOSIS — Z98.890 OTHER SPECIFIED POSTPROCEDURAL STATES: Chronic | ICD-10-CM

## 2024-06-03 LAB
ALBUMIN SERPL ELPH-MCNC: 4.5 G/DL — SIGNIFICANT CHANGE UP (ref 3.5–5.2)
ALP SERPL-CCNC: 53 U/L — SIGNIFICANT CHANGE UP (ref 30–115)
ALT FLD-CCNC: 11 U/L — SIGNIFICANT CHANGE UP (ref 0–41)
ANION GAP SERPL CALC-SCNC: 8 MMOL/L — SIGNIFICANT CHANGE UP (ref 7–14)
ANION GAP SERPL CALC-SCNC: 8 MMOL/L — SIGNIFICANT CHANGE UP (ref 7–14)
AST SERPL-CCNC: 13 U/L — SIGNIFICANT CHANGE UP (ref 0–41)
BILIRUB SERPL-MCNC: 0.2 MG/DL — SIGNIFICANT CHANGE UP (ref 0.2–1.2)
BUN SERPL-MCNC: 11 MG/DL — SIGNIFICANT CHANGE UP (ref 10–20)
BUN SERPL-MCNC: 11 MG/DL — SIGNIFICANT CHANGE UP (ref 10–20)
CALCIUM SERPL-MCNC: 9.9 MG/DL — SIGNIFICANT CHANGE UP (ref 8.4–10.5)
CALCIUM SERPL-MCNC: 9.9 MG/DL — SIGNIFICANT CHANGE UP (ref 8.4–10.5)
CHLORIDE SERPL-SCNC: 104 MMOL/L — SIGNIFICANT CHANGE UP (ref 98–110)
CHLORIDE SERPL-SCNC: 104 MMOL/L — SIGNIFICANT CHANGE UP (ref 98–110)
CO2 SERPL-SCNC: 27 MMOL/L — SIGNIFICANT CHANGE UP (ref 17–32)
CO2 SERPL-SCNC: 27 MMOL/L — SIGNIFICANT CHANGE UP (ref 17–32)
CREAT SERPL-MCNC: 0.6 MG/DL — LOW (ref 0.7–1.5)
CREAT SERPL-MCNC: 0.6 MG/DL — LOW (ref 0.7–1.5)
EGFR: 101 ML/MIN/1.73M2 — SIGNIFICANT CHANGE UP
EGFR: 101 ML/MIN/1.73M2 — SIGNIFICANT CHANGE UP
GLUCOSE SERPL-MCNC: 122 MG/DL — HIGH (ref 70–99)
GLUCOSE SERPL-MCNC: 122 MG/DL — HIGH (ref 70–99)
HCT VFR BLD CALC: 41.2 % — LOW (ref 42–52)
HGB BLD-MCNC: 14 G/DL — SIGNIFICANT CHANGE UP (ref 14–18)
MCHC RBC-ENTMCNC: 29.8 PG — SIGNIFICANT CHANGE UP (ref 27–31)
MCHC RBC-ENTMCNC: 34 G/DL — SIGNIFICANT CHANGE UP (ref 32–37)
MCV RBC AUTO: 87.7 FL — SIGNIFICANT CHANGE UP (ref 80–94)
NRBC # BLD: 0 /100 WBCS — SIGNIFICANT CHANGE UP (ref 0–0)
PLATELET # BLD AUTO: 80 K/UL — LOW (ref 130–400)
PMV BLD: 11.9 FL — HIGH (ref 7.4–10.4)
POTASSIUM SERPL-MCNC: 4.9 MMOL/L — SIGNIFICANT CHANGE UP (ref 3.5–5)
POTASSIUM SERPL-MCNC: 4.9 MMOL/L — SIGNIFICANT CHANGE UP (ref 3.5–5)
POTASSIUM SERPL-SCNC: 4.9 MMOL/L — SIGNIFICANT CHANGE UP (ref 3.5–5)
POTASSIUM SERPL-SCNC: 4.9 MMOL/L — SIGNIFICANT CHANGE UP (ref 3.5–5)
PROT SERPL-MCNC: 6.7 G/DL — SIGNIFICANT CHANGE UP (ref 6–8)
RBC # BLD: 4.7 M/UL — SIGNIFICANT CHANGE UP (ref 4.7–6.1)
RBC # FLD: 12 % — SIGNIFICANT CHANGE UP (ref 11.5–14.5)
SODIUM SERPL-SCNC: 139 MMOL/L — SIGNIFICANT CHANGE UP (ref 135–146)
SODIUM SERPL-SCNC: 139 MMOL/L — SIGNIFICANT CHANGE UP (ref 135–146)
WBC # BLD: 7.38 K/UL — SIGNIFICANT CHANGE UP (ref 4.8–10.8)
WBC # FLD AUTO: 7.38 K/UL — SIGNIFICANT CHANGE UP (ref 4.8–10.8)

## 2024-06-03 PROCEDURE — 80048 BASIC METABOLIC PNL TOTAL CA: CPT

## 2024-06-03 PROCEDURE — 36415 COLL VENOUS BLD VENIPUNCTURE: CPT

## 2024-06-03 PROCEDURE — 49594 RPR AA HRN 1ST 3-10 NCR/STRN: CPT

## 2024-06-03 PROCEDURE — 99223 1ST HOSP IP/OBS HIGH 75: CPT

## 2024-06-03 PROCEDURE — 74174 CTA ABD&PLVS W/CONTRAST: CPT | Mod: MC

## 2024-06-03 PROCEDURE — 93979 VASCULAR STUDY: CPT

## 2024-06-03 PROCEDURE — 80053 COMPREHEN METABOLIC PANEL: CPT

## 2024-06-03 PROCEDURE — 85027 COMPLETE CBC AUTOMATED: CPT

## 2024-06-03 RX ORDER — ONDANSETRON 8 MG/1
4 TABLET, FILM COATED ORAL ONCE
Refills: 0 | Status: DISCONTINUED | OUTPATIENT
Start: 2024-06-03 | End: 2024-06-03

## 2024-06-03 RX ORDER — DEXTROSE 50 % IN WATER 50 %
12.5 SYRINGE (ML) INTRAVENOUS ONCE
Refills: 0 | Status: DISCONTINUED | OUTPATIENT
Start: 2024-06-03 | End: 2024-06-03

## 2024-06-03 RX ORDER — HYDROMORPHONE HYDROCHLORIDE 2 MG/ML
0.5 INJECTION INTRAMUSCULAR; INTRAVENOUS; SUBCUTANEOUS
Refills: 0 | Status: DISCONTINUED | OUTPATIENT
Start: 2024-06-03 | End: 2024-06-03

## 2024-06-03 RX ORDER — DEXTROSE 10 % IN WATER 10 %
125 INTRAVENOUS SOLUTION INTRAVENOUS ONCE
Refills: 0 | Status: DISCONTINUED | OUTPATIENT
Start: 2024-06-03 | End: 2024-06-03

## 2024-06-03 RX ORDER — SODIUM CHLORIDE 9 MG/ML
1000 INJECTION, SOLUTION INTRAVENOUS
Refills: 0 | Status: DISCONTINUED | OUTPATIENT
Start: 2024-06-03 | End: 2024-06-03

## 2024-06-03 RX ORDER — GLUCAGON INJECTION, SOLUTION 0.5 MG/.1ML
1 INJECTION, SOLUTION SUBCUTANEOUS ONCE
Refills: 0 | Status: DISCONTINUED | OUTPATIENT
Start: 2024-06-03 | End: 2024-06-03

## 2024-06-03 RX ORDER — DEXTROSE 50 % IN WATER 50 %
25 SYRINGE (ML) INTRAVENOUS ONCE
Refills: 0 | Status: DISCONTINUED | OUTPATIENT
Start: 2024-06-03 | End: 2024-06-03

## 2024-06-03 RX ORDER — ATORVASTATIN CALCIUM 80 MG/1
40 TABLET, FILM COATED ORAL AT BEDTIME
Refills: 0 | Status: DISCONTINUED | OUTPATIENT
Start: 2024-06-03 | End: 2024-06-03

## 2024-06-03 RX ORDER — INSULIN LISPRO 100/ML
VIAL (ML) SUBCUTANEOUS
Refills: 0 | Status: DISCONTINUED | OUTPATIENT
Start: 2024-06-03 | End: 2024-06-03

## 2024-06-03 RX ORDER — TRAMADOL HYDROCHLORIDE 50 MG/1
1 TABLET ORAL
Qty: 9 | Refills: 0
Start: 2024-06-03 | End: 2024-06-05

## 2024-06-03 RX ORDER — DEXTROSE 50 % IN WATER 50 %
15 SYRINGE (ML) INTRAVENOUS ONCE
Refills: 0 | Status: DISCONTINUED | OUTPATIENT
Start: 2024-06-03 | End: 2024-06-03

## 2024-06-03 RX ORDER — OXYCODONE AND ACETAMINOPHEN 5; 325 MG/1; MG/1
1 TABLET ORAL EVERY 4 HOURS
Refills: 0 | Status: DISCONTINUED | OUTPATIENT
Start: 2024-06-03 | End: 2024-06-03

## 2024-06-03 NOTE — DISCHARGE NOTE PROVIDER - CARE PROVIDER_API CALL
Kulwant Salas  Vascular Surgery  [address] [place] [address], [address]  [place] [address]  Phone: [phone]  Fax: [phone]  Follow Up Time: 2 weeks

## 2024-06-03 NOTE — H&P ADULT - ASSESSMENT
This is a 74 yo male with hx HTN, Hyperlipidemia, DM II, who underwent elective hernia repair and was noticed to have pulsatile mass in his abdomen. NOw pt is being admitted for further management, blood work and CTA Abd and pelvis. Pt is asymptomatic. He denies abd pian, back and flank pain.    Vascular surgery is admitting the pt for labs and CTA    Plan:  - admit to vascular surgery  - f/u labs  - obtain CTA A/P  - RISS for coverage  - further plan pending CTA  - hold off on DVT prophylaxis  - ambulate  - diet after CTA    SPECTRA [id number]

## 2024-06-03 NOTE — BRIEF OPERATIVE NOTE - NSICDXBRIEFPROCEDURE_GEN_ALL_CORE_FT
PROCEDURES:  Repair of anterior abdominal hernia(s) (ie, epigastric, incisional, ventral, umbilical, Spigelian), 03-Jun-2024 08:26:56  Byron Colin

## 2024-06-03 NOTE — DISCHARGE NOTE PROVIDER - NSDCFUSCHEDAPPT_GEN_ALL_CORE_FT
[facility] Physician [place] [address] [address]  Scheduled Appointment: 06/10/2024

## 2024-06-03 NOTE — DISCHARGE NOTE PROVIDER - NSDCMRMEDTOKEN_GEN_ALL_CORE_FT
aspirin 81 mg oral tablet: orally  enalapril 5 mg oral tablet: 1 tab(s) orally once a day  metFORMIN 500 mg oral tablet: 1 tab(s) orally 2 times a day  rosuvastatin 40 mg oral tablet: 1 tab(s) orally

## 2024-06-03 NOTE — H&P ADULT - NSHPPHYSICALEXAM_GEN_ALL_CORE
General: in no acute distress  Head: normocephalic  Neck: no JVD, supple  Heart: S1 S2, regular  Abd: + BS, soft, + pulsatile mass  Ext: no cyanosis/edema  A&O x 3, CN II-XII intact

## 2024-06-03 NOTE — DISCHARGE NOTE PROVIDER - NSDCCPCAREPLAN_GEN_ALL_CORE_FT
PRINCIPAL DISCHARGE DIAGNOSIS  Diagnosis: Aortic aneurysm  Assessment and Plan of Treatment: Diet:    - Continue low fiber diet as tolerated.    - Please avoid roughage like beef for now and instead choose softer foods including chicken, fish, and pastas.  Activity:    - Avoid heavy lifting or straining (anything over 10-15 pounds) for at least 6 weeks.    - You may ambulate and otherwise resume normal daily activities as tolerated.    - Please take home your incentive spirometer and continue to use 10x/hour while awake.  Incisions:    - You may remove your dressings.    - You may shower and allow soap and water to rinse over incisions.    - Please avoid scrubbing the areas and do not submerge your incisions in water for at least 2 weeks.  Medications:    - You may resume your home medications.    - You may take Tylenol 650mg every 6 hours and alternate with Ibuprofen 600mg every 8 hours for pain. You may find these medications over the counter at your local pharmacy.  Follow-up:    - Please call the office to schedule a follow-up appointment with Dr. Salas within 1-2 weeks, or follow-up as previously scheduled.  Please call the office or return to the ED with persistent fever greater than 100.4F, chest pain, shortness of breath, uncontrollable nausea/vomiting/abdominal pain, constipation, bloody bowel movements, abdominal distention, inability to tolerate oral intake.     PRINCIPAL DISCHARGE DIAGNOSIS  Diagnosis: Aortic aneurysm  Assessment and Plan of Treatment: Diet:    - Continue low fiber diet as tolerated.    - Please avoid roughage like beef for now and instead choose softer foods including chicken, fish, and pastas.  Activity:    - Avoid heavy lifting or straining (anything over 10-15 pounds) for at least 6 weeks.   - Abdominal binder in place except when showering  - May apply ice packs for pain   - You may ambulate and otherwise resume normal daily activities as tolerated.    - Please take home your incentive spirometer and continue to use 10x/hour while awake.  Incisions:    - You may remove your dressings.    - You may shower and allow soap and water to rinse over incisions.    - Please avoid scrubbing the areas and do not submerge your incisions in water for at least 2 weeks.  Medications:    - You may resume your home medications.    - You may take Tylenol 650mg every 6 hours and alternate with Ibuprofen 600mg every 8 hours for pain. You may find these medications over the counter at your local pharmacy.  Follow-up:    - Please call the office to schedule a follow-up appointment with Dr. Salas within 1-2 weeks, or follow-up as previously scheduled.  Please call the office or return to the ED with persistent fever greater than 100.4F, chest pain, shortness of breath, uncontrollable nausea/vomiting/abdominal pain, constipation, bloody bowel movements, abdominal distention, inability to tolerate oral intake.

## 2024-06-03 NOTE — DISCHARGE NOTE PROVIDER - HOSPITAL COURSE
Patient is a 73M w/ PMH HTN, HLD, DM II who underwent elective hernia repair w/ incidental finding of pulsatile mass in abdomen. CT revealed AAA w/ max size 6cm. Patient will follow up outpatient and will be scheduled for EVAR procedure at later date. At this time, patient is medically safe for discharge.

## 2024-06-03 NOTE — ASU DISCHARGE PLAN (ADULT/PEDIATRIC) - CARE PROVIDER_API CALL
Byron Colin  Surgery  [address] [place] [id number]  Phone: [phone]  Fax: [phone]  Scheduled Appointment: 06/10/2024 09:40 AM

## 2024-06-03 NOTE — DISCHARGE NOTE NURSING/CASE MANAGEMENT/SOCIAL WORK - PATIENT PORTAL LINK FT
You can access the FollowMyHealth Patient Portal offered by [facility] by registering at the following website: [email or web address]. By joining [facility]’s FollowMyHealth portal, you will also be able to view your health information using other applications (apps) compatible with our system.

## 2024-06-03 NOTE — DISCHARGE NOTE NURSING/CASE MANAGEMENT/SOCIAL WORK - NSDCPEFALRISK_GEN_ALL_CORE
For information on Fall & Injury Prevention, visit: [email or web address] OR  [email or web address] OR  https://www.cdc.gov/steadi/patient.html

## 2024-06-03 NOTE — H&P ADULT - HISTORY OF PRESENT ILLNESS
This is a 72 yo male with hx HTN, Hyperlipidemia, DM II, who underwent elective hernia repair and was noticed to have pulsatile mass in his abdomen. NOw pt is being admitted for further management, blood work and CTA Abd and pelvis. Pt is asymptomatic. He denies abd pian, back and flank pain.

## 2024-06-03 NOTE — ASU DISCHARGE PLAN (ADULT/PEDIATRIC) - COMMENTS
- You will see [facility] Physician Assistant, Marilyn Gustafson, at your postoperative visit noted above.

## 2024-06-03 NOTE — ASU DISCHARGE PLAN (ADULT/PEDIATRIC) - ASU DC SPECIAL INSTRUCTIONSFT
Diet    Eat light on the day of surgery. Nausea and vomiting can occur after anesthesia,   but usually resolve within 24 hours.  Resume normal diet the following day.      Activity    Rest!  No heavy lifting or strenuous activity.    Medications    Ibuprofen (Advil, Motrin), Naproxen (Aleve) and/or Extra-Strength Tylenol for pain.  Tramadol for severe pain only.  Your prescription was sent electronically to your pharmacy.  Remember, Tramadol is a strong narcotic pain reliever which can cause drowsiness, upset stomach and constipation.  It should always be taken with food.  You can use stool softener (Mineral Oil) or laxative (MiraLax or Dulcolax) if constipated.  An antibiotic is given during surgery.  No antibiotic needed at home.  Resume all previous medications.  Resume blood thinners the day after surgery unless told otherwise.    Wound Care    Leave surgical dressing in place.  May shower (dressing is waterproof.)  No pool, ocean, lake, hot-tub or bath for 3 weeks. If you were given an abdominal binder, please wear it as much as possible (day & night) for 2 weeks, but you must remove it for showers.  Ice packs to the area intermittently for several days help with pain and swelling.  Bruising (“black and blue”) is common.  Treatment is…Ice & Rest.       - You will see [facility] Physician Assistant, Marilyn Gustafson, at your postoperative visit noted below.

## 2024-06-04 ENCOUNTER — TRANSCRIPTION ENCOUNTER (OUTPATIENT)
Age: 74
End: 2024-06-04

## 2024-06-10 ENCOUNTER — APPOINTMENT (OUTPATIENT)
Dept: SURGERY | Facility: CLINIC | Age: 74
End: 2024-06-10
Payer: MEDICARE

## 2024-06-10 DIAGNOSIS — K42.0 UMBILICAL HERNIA WITH OBSTRUCTION, W/OUT GANGRENE: ICD-10-CM

## 2024-06-10 PROCEDURE — 99213 OFFICE O/P EST LOW 20 MIN: CPT

## 2024-06-10 RX ORDER — TRAMADOL HYDROCHLORIDE 50 MG/1
50 TABLET, COATED ORAL
Qty: 20 | Refills: 0 | Status: COMPLETED | COMMUNITY
Start: 2024-05-30 | End: 2024-06-10

## 2024-06-10 RX ORDER — METFORMIN HYDROCHLORIDE 850 MG/1
1 TABLET ORAL
Refills: 0 | DISCHARGE

## 2024-06-10 NOTE — ASSESSMENT
[FreeTextEntry1] : JOSAFAT WILSON underwent an  incarcerated umbilical hernia repair with mesh with Dr. Colin on 6/3/24  under local IV sedation without any problems or complications.  However, a pulsatile mass was noted in his abdomen intraoperatively and a post-surgical ultrasound/duplex was performed demonstrating a large abdominal aorta aneurysm.  CTA was subsequently completed confirming this finding.  Vascular surgery consult was done at bedside and Josafat will continue following up with Dr. Kulwant Salas and he will be scheduled for an outpatient EVAR.    His wound is clean, dry and intact. There is no evidence of erythema, seroma formation or infection. He is tolerating a diet and having normal bowel movements. He denies any significant postoperative pain or discomfort at this time.   He was counseled and reassured. JOSAFAT was discharged from the office with no specific follow up necessary, but he knows to avoid any heavy lifting or strenuous activity for the next several weeks. He  was encouraged to continue to wear his abdominal binder for the better part of the next month.

## 2024-06-12 PROBLEM — E04.1 NONTOXIC SINGLE THYROID NODULE: Chronic | Status: ACTIVE | Noted: 2024-06-03

## 2024-06-26 ENCOUNTER — OUTPATIENT (OUTPATIENT)
Dept: OUTPATIENT SERVICES | Facility: HOSPITAL | Age: 74
LOS: 1 days | End: 2024-06-26
Payer: MEDICARE

## 2024-06-26 VITALS
HEART RATE: 61 BPM | WEIGHT: 169.98 LBS | HEIGHT: 68 IN | SYSTOLIC BLOOD PRESSURE: 132 MMHG | OXYGEN SATURATION: 97 % | TEMPERATURE: 98 F | DIASTOLIC BLOOD PRESSURE: 81 MMHG | RESPIRATION RATE: 19 BRPM

## 2024-06-26 DIAGNOSIS — Z01.818 ENCOUNTER FOR OTHER PREPROCEDURAL EXAMINATION: ICD-10-CM

## 2024-06-26 DIAGNOSIS — I71.40 ABDOMINAL AORTIC ANEURYSM, WITHOUT RUPTURE, UNSPECIFIED: ICD-10-CM

## 2024-06-26 DIAGNOSIS — Z98.890 OTHER SPECIFIED POSTPROCEDURAL STATES: Chronic | ICD-10-CM

## 2024-06-26 LAB
A1C WITH ESTIMATED AVERAGE GLUCOSE RESULT: 6.3 % — HIGH (ref 4–5.6)
APTT BLD: 26.8 SEC — LOW (ref 27–39.2)
ESTIMATED AVERAGE GLUCOSE: 134 MG/DL — HIGH (ref 68–114)
INR BLD: 0.97 RATIO — SIGNIFICANT CHANGE UP (ref 0.65–1.3)
PROTHROM AB SERPL-ACNC: 11.1 SEC — SIGNIFICANT CHANGE UP (ref 9.95–12.87)

## 2024-06-26 PROCEDURE — 85730 THROMBOPLASTIN TIME PARTIAL: CPT

## 2024-06-26 PROCEDURE — 93010 ELECTROCARDIOGRAM REPORT: CPT

## 2024-06-26 PROCEDURE — 83036 HEMOGLOBIN GLYCOSYLATED A1C: CPT

## 2024-06-26 PROCEDURE — 85610 PROTHROMBIN TIME: CPT

## 2024-06-26 PROCEDURE — 99214 OFFICE O/P EST MOD 30 MIN: CPT | Mod: 25

## 2024-06-26 PROCEDURE — 93005 ELECTROCARDIOGRAM TRACING: CPT

## 2024-06-26 PROCEDURE — 36415 COLL VENOUS BLD VENIPUNCTURE: CPT

## 2024-06-26 RX ORDER — ASPIRIN/CALCIUM CARB/MAGNESIUM 324 MG
0 TABLET ORAL
Refills: 0 | DISCHARGE

## 2024-06-26 RX ORDER — METFORMIN HYDROCHLORIDE 850 MG/1
1 TABLET ORAL
Refills: 0 | DISCHARGE

## 2024-06-26 RX ORDER — ROSUVASTATIN CALCIUM 5 MG/1
1 TABLET ORAL
Refills: 0 | DISCHARGE

## 2024-06-27 DIAGNOSIS — I71.40 ABDOMINAL AORTIC ANEURYSM, WITHOUT RUPTURE, UNSPECIFIED: ICD-10-CM

## 2024-06-27 DIAGNOSIS — Z01.818 ENCOUNTER FOR OTHER PREPROCEDURAL EXAMINATION: ICD-10-CM

## 2024-07-09 NOTE — ASU PATIENT PROFILE, ADULT - FALL HARM RISK - UNIVERSAL INTERVENTIONS
Bed in lowest position, wheels locked, appropriate side rails in place/Call bell, personal items and telephone in reach/Instruct patient to call for assistance before getting out of bed or chair/Non-slip footwear when patient is out of bed/[place] to call system/Physically safe environment - no spills, clutter or unnecessary equipment/Purposeful Proactive Rounding/Room/bathroom lighting operational, light cord in reach

## 2024-07-09 NOTE — ASU PATIENT PROFILE, ADULT - NSICDXPASTMEDICALHX_GEN_ALL_CORE_FT
PAST MEDICAL HISTORY:  Diabetes     H/O abdominal aortic aneurysm     High cholesterol     Thyroid nodule

## 2024-07-10 ENCOUNTER — INPATIENT (INPATIENT)
Facility: HOSPITAL | Age: 74
LOS: 0 days | Discharge: HOME | DRG: 301 | End: 2024-07-11
Attending: SURGERY | Admitting: SURGERY
Payer: MEDICARE

## 2024-07-10 ENCOUNTER — APPOINTMENT (OUTPATIENT)
Dept: VASCULAR SURGERY | Facility: HOSPITAL | Age: 74
End: 2024-07-10

## 2024-07-10 VITALS
WEIGHT: 164.91 LBS | HEIGHT: 68 IN | OXYGEN SATURATION: 98 % | RESPIRATION RATE: 16 BRPM | HEART RATE: 51 BPM | DIASTOLIC BLOOD PRESSURE: 57 MMHG | TEMPERATURE: 99 F | SYSTOLIC BLOOD PRESSURE: 124 MMHG

## 2024-07-10 DIAGNOSIS — Z98.890 OTHER SPECIFIED POSTPROCEDURAL STATES: Chronic | ICD-10-CM

## 2024-07-10 DIAGNOSIS — I71.40 ABDOMINAL AORTIC ANEURYSM, WITHOUT RUPTURE, UNSPECIFIED: ICD-10-CM

## 2024-07-10 LAB
ABO RH CONFIRMATION: SIGNIFICANT CHANGE UP
ANION GAP SERPL CALC-SCNC: 10 MMOL/L — SIGNIFICANT CHANGE UP (ref 7–14)
ANION GAP SERPL CALC-SCNC: 10 MMOL/L — SIGNIFICANT CHANGE UP (ref 7–14)
BLD GP AB SCN SERPL QL: SIGNIFICANT CHANGE UP
BUN SERPL-MCNC: 11 MG/DL — SIGNIFICANT CHANGE UP (ref 10–20)
BUN SERPL-MCNC: 8 MG/DL — LOW (ref 10–20)
CALCIUM SERPL-MCNC: 9.1 MG/DL — SIGNIFICANT CHANGE UP (ref 8.4–10.5)
CALCIUM SERPL-MCNC: 9.2 MG/DL — SIGNIFICANT CHANGE UP (ref 8.4–10.5)
CHLORIDE SERPL-SCNC: 104 MMOL/L — SIGNIFICANT CHANGE UP (ref 98–110)
CHLORIDE SERPL-SCNC: 105 MMOL/L — SIGNIFICANT CHANGE UP (ref 98–110)
CO2 SERPL-SCNC: 22 MMOL/L — SIGNIFICANT CHANGE UP (ref 17–32)
CO2 SERPL-SCNC: 24 MMOL/L — SIGNIFICANT CHANGE UP (ref 17–32)
CREAT SERPL-MCNC: 0.6 MG/DL — LOW (ref 0.7–1.5)
CREAT SERPL-MCNC: 0.6 MG/DL — LOW (ref 0.7–1.5)
EGFR: 101 ML/MIN/1.73M2 — SIGNIFICANT CHANGE UP
EGFR: 101 ML/MIN/1.73M2 — SIGNIFICANT CHANGE UP
GLUCOSE BLDC GLUCOMTR-MCNC: 120 MG/DL — HIGH (ref 70–99)
GLUCOSE BLDC GLUCOMTR-MCNC: 138 MG/DL — HIGH (ref 70–99)
GLUCOSE BLDC GLUCOMTR-MCNC: 159 MG/DL — HIGH (ref 70–99)
GLUCOSE BLDC GLUCOMTR-MCNC: 173 MG/DL — HIGH (ref 70–99)
GLUCOSE SERPL-MCNC: 144 MG/DL — HIGH (ref 70–99)
GLUCOSE SERPL-MCNC: 165 MG/DL — HIGH (ref 70–99)
HCT VFR BLD CALC: 36.7 % — LOW (ref 42–52)
HGB BLD-MCNC: 13 G/DL — LOW (ref 14–18)
MAGNESIUM SERPL-MCNC: 1.9 MG/DL — SIGNIFICANT CHANGE UP (ref 1.8–2.4)
MAGNESIUM SERPL-MCNC: 2.1 MG/DL — SIGNIFICANT CHANGE UP (ref 1.8–2.4)
MCHC RBC-ENTMCNC: 30.4 PG — SIGNIFICANT CHANGE UP (ref 27–31)
MCHC RBC-ENTMCNC: 35.4 G/DL — SIGNIFICANT CHANGE UP (ref 32–37)
MCV RBC AUTO: 85.7 FL — SIGNIFICANT CHANGE UP (ref 80–94)
NRBC # BLD: 0 /100 WBCS — SIGNIFICANT CHANGE UP (ref 0–0)
PHOSPHATE SERPL-MCNC: 2.7 MG/DL — SIGNIFICANT CHANGE UP (ref 2.1–4.9)
PHOSPHATE SERPL-MCNC: 4 MG/DL — SIGNIFICANT CHANGE UP (ref 2.1–4.9)
PLATELET # BLD AUTO: 75 K/UL — LOW (ref 130–400)
PMV BLD: 11.6 FL — HIGH (ref 7.4–10.4)
POTASSIUM SERPL-MCNC: 3.7 MMOL/L — SIGNIFICANT CHANGE UP (ref 3.5–5)
POTASSIUM SERPL-MCNC: 4.1 MMOL/L — SIGNIFICANT CHANGE UP (ref 3.5–5)
POTASSIUM SERPL-SCNC: 3.7 MMOL/L — SIGNIFICANT CHANGE UP (ref 3.5–5)
POTASSIUM SERPL-SCNC: 4.1 MMOL/L — SIGNIFICANT CHANGE UP (ref 3.5–5)
RBC # BLD: 4.28 M/UL — LOW (ref 4.7–6.1)
RBC # FLD: 12.4 % — SIGNIFICANT CHANGE UP (ref 11.5–14.5)
SODIUM SERPL-SCNC: 136 MMOL/L — SIGNIFICANT CHANGE UP (ref 135–146)
SODIUM SERPL-SCNC: 139 MMOL/L — SIGNIFICANT CHANGE UP (ref 135–146)
WBC # BLD: 16.58 K/UL — HIGH (ref 4.8–10.8)
WBC # FLD AUTO: 16.58 K/UL — HIGH (ref 4.8–10.8)

## 2024-07-10 PROCEDURE — C1887: CPT

## 2024-07-10 PROCEDURE — C1751: CPT

## 2024-07-10 PROCEDURE — 74018 RADEX ABDOMEN 1 VIEW: CPT

## 2024-07-10 PROCEDURE — 93005 ELECTROCARDIOGRAM TRACING: CPT

## 2024-07-10 PROCEDURE — 80048 BASIC METABOLIC PNL TOTAL CA: CPT

## 2024-07-10 PROCEDURE — C1769: CPT

## 2024-07-10 PROCEDURE — 84100 ASSAY OF PHOSPHORUS: CPT

## 2024-07-10 PROCEDURE — C1874: CPT

## 2024-07-10 PROCEDURE — 34705 EVAC RPR A-BIILIAC NDGFT: CPT

## 2024-07-10 PROCEDURE — 85027 COMPLETE CBC AUTOMATED: CPT

## 2024-07-10 PROCEDURE — 94010 BREATHING CAPACITY TEST: CPT

## 2024-07-10 PROCEDURE — 94760 N-INVAS EAR/PLS OXIMETRY 1: CPT

## 2024-07-10 PROCEDURE — 36415 COLL VENOUS BLD VENIPUNCTURE: CPT

## 2024-07-10 PROCEDURE — 82962 GLUCOSE BLOOD TEST: CPT

## 2024-07-10 PROCEDURE — 86900 BLOOD TYPING SEROLOGIC ABO: CPT

## 2024-07-10 PROCEDURE — C1889: CPT

## 2024-07-10 PROCEDURE — C1760: CPT

## 2024-07-10 PROCEDURE — C1725: CPT

## 2024-07-10 PROCEDURE — 94660 CPAP INITIATION&MGMT: CPT

## 2024-07-10 PROCEDURE — 34713 PERQ ACCESS & CLSR FEM ART: CPT | Mod: 50

## 2024-07-10 PROCEDURE — 93010 ELECTROCARDIOGRAM REPORT: CPT

## 2024-07-10 PROCEDURE — 86850 RBC ANTIBODY SCREEN: CPT

## 2024-07-10 PROCEDURE — 83735 ASSAY OF MAGNESIUM: CPT

## 2024-07-10 PROCEDURE — 86901 BLOOD TYPING SEROLOGIC RH(D): CPT

## 2024-07-10 PROCEDURE — 99291 CRITICAL CARE FIRST HOUR: CPT

## 2024-07-10 PROCEDURE — C1894: CPT

## 2024-07-10 RX ORDER — ASPIRIN 325 MG/1
81 TABLET, FILM COATED ORAL DAILY
Refills: 0 | Status: DISCONTINUED | OUTPATIENT
Start: 2024-07-11 | End: 2024-07-11

## 2024-07-10 RX ORDER — ATORVASTATIN CALCIUM 20 MG/1
20 TABLET, FILM COATED ORAL AT BEDTIME
Refills: 0 | Status: DISCONTINUED | OUTPATIENT
Start: 2024-07-10 | End: 2024-07-10

## 2024-07-10 RX ORDER — ROSUVASTATIN CALCIUM 20 MG/1
20 TABLET ORAL AT BEDTIME
Refills: 0 | Status: DISCONTINUED | OUTPATIENT
Start: 2024-07-10 | End: 2024-07-11

## 2024-07-10 RX ORDER — ENALAPRIL MALEATE 20 MG
5 TABLET ORAL DAILY
Refills: 0 | Status: DISCONTINUED | OUTPATIENT
Start: 2024-07-10 | End: 2024-07-11

## 2024-07-10 RX ORDER — DEXTROSE MONOHYDRATE AND SODIUM CHLORIDE 5; .3 G/100ML; G/100ML
1000 INJECTION, SOLUTION INTRAVENOUS
Refills: 0 | Status: DISCONTINUED | OUTPATIENT
Start: 2024-07-10 | End: 2024-07-11

## 2024-07-10 RX ORDER — CALCIUM CARBONATE 500(1250)
1 TABLET,CHEWABLE ORAL EVERY 6 HOURS
Refills: 0 | Status: DISCONTINUED | OUTPATIENT
Start: 2024-07-10 | End: 2024-07-11

## 2024-07-10 RX ORDER — ACETAMINOPHEN 325 MG
650 TABLET ORAL EVERY 6 HOURS
Refills: 0 | Status: DISCONTINUED | OUTPATIENT
Start: 2024-07-10 | End: 2024-07-11

## 2024-07-10 RX ORDER — MAGNESIUM SULFATE 100 %
1 POWDER (GRAM) MISCELLANEOUS ONCE
Refills: 0 | Status: COMPLETED | OUTPATIENT
Start: 2024-07-10 | End: 2024-07-10

## 2024-07-10 RX ORDER — CEFAZOLIN 10 G/1
2000 INJECTION, POWDER, FOR SOLUTION INTRAVENOUS EVERY 8 HOURS
Refills: 0 | Status: COMPLETED | OUTPATIENT
Start: 2024-07-10 | End: 2024-07-10

## 2024-07-10 RX ORDER — INSULIN LISPRO 100 [IU]/ML
INJECTION, SOLUTION SUBCUTANEOUS
Refills: 0 | Status: DISCONTINUED | OUTPATIENT
Start: 2024-07-10 | End: 2024-07-11

## 2024-07-10 RX ORDER — ENALAPRIL MALEATE 20 MG
1.25 TABLET ORAL ONCE
Refills: 0 | Status: COMPLETED | OUTPATIENT
Start: 2024-07-10 | End: 2024-07-11

## 2024-07-10 RX ADMIN — Medication 5 MILLIGRAM(S): at 13:06

## 2024-07-10 RX ADMIN — Medication 1 TABLET(S): at 23:02

## 2024-07-10 RX ADMIN — INSULIN LISPRO 1: 100 INJECTION, SOLUTION SUBCUTANEOUS at 12:38

## 2024-07-10 RX ADMIN — ROSUVASTATIN CALCIUM 20 MILLIGRAM(S): 20 TABLET ORAL at 22:29

## 2024-07-10 RX ADMIN — Medication 25 GRAM(S): at 12:55

## 2024-07-10 RX ADMIN — Medication 63.75 MILLIMOLE(S): at 15:35

## 2024-07-10 RX ADMIN — CEFAZOLIN 100 MILLIGRAM(S): 10 INJECTION, POWDER, FOR SOLUTION INTRAVENOUS at 14:23

## 2024-07-10 RX ADMIN — DEXTROSE MONOHYDRATE AND SODIUM CHLORIDE 75 MILLILITER(S): 5; .3 INJECTION, SOLUTION INTRAVENOUS at 12:35

## 2024-07-10 RX ADMIN — CEFAZOLIN 100 MILLIGRAM(S): 10 INJECTION, POWDER, FOR SOLUTION INTRAVENOUS at 22:26

## 2024-07-10 RX ADMIN — INSULIN LISPRO 1: 100 INJECTION, SOLUTION SUBCUTANEOUS at 04:30

## 2024-07-10 NOTE — CONSULT NOTE ADULT - SUBJECTIVE AND OBJECTIVE BOX
ROSE WILSON   [id number]   [birth year]    73yM  ============================================================   DATE OF INITIAL SICU/SDU CONSULT: 07-10-24    INDICATION FOR SICU CONSULT:  q1hr neurovascular checks s/p EVAR     SICU COURSE EVENTS :  07-10 - admitted to SICU service     24HOUR EVENTS  -Admission under SICU service  - palpable DP/PT b/l     [X] A ten-point review of systems was negative except as expressed in note.  [X] History was obtained from patient. If unable to participate in their care, history obtained from review of the chart and collateral sources of information.  ============================================================      HPI   HPI:  Patient is a 74 y/o male with PMHx of NEEL (on CPAP), HTN, HLD, DM. Patient underwent elective hernia repair (June 2024) and was noticed to have pulsatile mass in his abdomen. CTA showed Infrarenal abdominal aortic aneurysm measuring 6.8 x 7.1 x 7.8 cm. Patient scheduled for AAA repair with Dr. Salas. Patient underwent pre-surgical testing; pre-op EKG showed sinus bradycardia to 59. Patient is now s/p EVAR on 7/10. Patient admitted to SICU for q1hr neurovascular checks.       Pertinent Imaging    PROCEDURE DATE:  06/03/2024    INTERPRETATION:  Clinical Indication: Assess for abdominal aortic   aneurysm. Recent repair of umbilical hernia.    Technique: Multidetector-row CTA images of the abdomen and pelvis were   obtained from the xiphoid through the symphysis pubis following the   administration of intravenous contrast. Noncontrast, arterial and delayed   phases were obtained as part of the dissection protocol. No oral contrast   was administered.  Coronal and sagittal reconstructions were performed.    Contrast: 95 cc Omnipaque 350 non-ionic intravenous contrast.    Comparison: None available.    Findings:    01. LIVER: Normal morphology.  No focal lesion.  02. SPLEEN: Normal.  03. PANCREAS: Normal.  04. GALLBLADDER/BILIARY TREE: No biliary duct dilatation.  Normal gallbladder.  05. ADRENALS: Enhancing indeterminate left adrenal gland nodule measuring 1.3 cm (series 9 image 43). Normal right adrenal gland.  06. KIDNEYS: Symmetric enhancement.  No hydronephrosis or renal stone.  07. LYMPHADENOPATHY/RETROPERITONEUM: No lymphadenopathy.  08. BOWEL: No bowel obstruction. Unremarkable appendix.  09. PELVIC VISCERA: Prostate measures 4.2 cm in transverse dimension (series 9 image 129). Unremarkable urinary bladder.  10. PELVIC LYMPH NODES: No lymphadenopathy.  11. VASCULATURE: Infrarenal abdominal aortic aneurysm measuring 6.8 x 7.1 x 7.8 (AP x transverse x craniocaudal) cm (series 9 image 16 and series 19 image 35). Patent celiac, SMA, bilateral renal and JENNIFER branches. Atherosclerotic disease of the aorta and its branches.  12. PERITONEUM/ABDOMINAL WALL: No gross ascites. Postsurgical changes at the level of the umbilicus with subcutaneous free air extending to the peritoneum.  13. SKELETAL: Degenerative changes.  14. LUNG BASES: Bilateral small atelectasis.    IMPRESSION:    Infrarenal abdominal aortic aneurysm measuring 6.8 x 7.1 x 7.8 cm.    Enhancing indeterminate left adrenal gland nodule. Recommend outpatient   MRI or CT adrenal gland with and without contrast.    --- End of Report ---        OR Stats for 07-10-24    OR Time : 2 hours  IV Fluids: 2200cc  EBL: 50cc  Blood Products: none  UOP: 900cc  Findings -  Operative Findings	  1. US guided b/l CFA access (16F sheath R, 12F sheath L)  2. Aortobi-iliac angiogram  3. [place] excluder [id number] infrarenal deployment, 14x14 L iliac limb  4. Perclose b/l CFA  Findings: Infrarenal aortic aneurysm without rupture. Final angiogram revealed patent hypogastrics b/l, patent renal arteries b/l, and no evidence of endoleak.  Palpable DP/PT b/l at conclusion of case      PAST MEDICAL & SURGICAL HISTORY  Diabetes  History of thyroid nodule  Thyroid nodule  Hypertension  High cholesterol  H/O abdominal aortic aneurysm      HOME MEDICATIONS    aspirin 81 mg oral tablet: orally  enalapril 5 mg oral tablet: 1 tab(s) orally once a day  metFORMIN 500 mg oral tablet: 1 tab(s) orally 2 times a day  rosuvastatin 40 mg oral tablet: 1 tab(s) orally    ACTIVE MEDICATIONS    acetaminophen     Tablet .. 650 milliGRAM(s) Oral every 6 hours PRN Mild Pain (1 - 3)  ceFAZolin   IVPB 2000 milliGRAM(s) IV Intermittent every 8 hours  enalapril 5 milliGRAM(s) Oral daily  insulin lispro (ADMELOG) corrective regimen sliding scale   SubCutaneous Before meals and at bedtime  lactated ringers. 1000 milliLiter(s) IV Continuous <Continuous>    ALLERGIES  Allergies  No Known Allergies  Intolerances        VITAL SIGNS (Last 24Hours)  ICU Vital Signs Last 24 Hrs  T(C): 36.4 (10 Jul 2024 10:55), Max: 37.1 (10 Jul 2024 06:15)  T(F): 97.6 (10 Jul 2024 10:55), Max: 98.8 (10 Jul 2024 06:15)  HR: 73 (10 Jul 2024 11:15) (51 - 78)  BP: 161/87 (10 Jul 2024 11:15) (124/57 - 161/87)  BP(mean): 102 (10 Jul 2024 11:05) (102 - 102)  ABP: 153/68 (10 Jul 2024 11:15) (153/68 - 160/71)  ABP(mean): 101 (10 Jul 2024 11:15) (101 - 105)  RR: 14 (10 Jul 2024 11:15) (14 - 16)  SpO2: 99% (10 Jul 2024 11:15) (98% - 99%)          PHYSICAL EXAM   GCS:   15  Exam: A&Ox3, no focal deficits.     RESPIRATORY:  Normal expansion/effort on 1L NC. Equal chest rise b/l.    CARDIOVASCULAR:   S1, S2. RRR on monitor. No peripheral edema    VASCULAR:  Palpable DP/PT pulses B/l. B/l groins with dressing, no strikethrough. Soft, without hematoma.     GASTROINTESTINAL:  Abdomen soft, non-tender, non-distended.    MUSCULOSKELETAL:  Extremities warm. Moving all extremities without limitations.     DERM:  No skin breakdown noted.     :   Exam: Muñoz catheter in place.     ----------------------------------------------------------------------------------------------------------  Tubes/Lines/Drains    [x] Peripheral IV    [x] Urinary Catheter Muñoz  [ ]Present on Admission          Insertion Date:     7/10/24                              [ ] Central Venous Line       [ ]IJ             [ ]Femoral     [ ]Subclavian   [ ]Left  [ ]Right      Insertion Date:          [x] Arterial Line 	                [x]Radial    [ ]Femoral     [ ]Axillary         [x]Left  [ ]Right      Insertion Date:    7/10/24

## 2024-07-10 NOTE — BRIEF OPERATIVE NOTE - NSICDXBRIEFPREOP_GEN_ALL_CORE_FT
PRE-OP DIAGNOSIS:  Infrarenal abdominal aortic aneurysm, without rupture 10-Jul-2024 10:31:01  Tyler Thomas

## 2024-07-10 NOTE — PATIENT PROFILE ADULT - LEGAL HELP
PCO OU- Visually Significant OD; schedule YAG Cap OD only. Pros cons risks and benefits. Will continue to observe PCO OS. no

## 2024-07-10 NOTE — CHART NOTE - NSCHARTNOTEFT_GEN_A_CORE
Vascular Surgery Post-Op Note    Pre-Op Dx: Abdominal aortic aneurysm (AAA) without rupture    Infrarenal abdominal aortic aneurysm, without rupture      Procedure: Endovascular repair of non-ruptured infrarenal abdominal aortic aneurysm (AAA) using endograft with closure of femoral artery      Surgeon: Dr. Salas    Subjective: 74 y/o M s/p above procedure 4 hours later. Denies pain, numbness, tingling. Feeling comfortable in bed.    Vital Signs Last 24 Hrs  T(C): 36.3 (10 Jul 2024 12:00), Max: 37.1 (10 Jul 2024 06:15)  T(F): 97.3 (10 Jul 2024 12:00), Max: 98.8 (10 Jul 2024 06:15)  HR: 60 (10 Jul 2024 14:00) (51 - 78)  BP: 136/66 (10 Jul 2024 14:00) (124/57 - 161/87)  BP(mean): 95 (10 Jul 2024 14:00) (95 - 104)  RR: 17 (10 Jul 2024 14:00) (14 - 27)  SpO2: 96% (10 Jul 2024 14:00) (96% - 99%)        Physical Exam:  General: NAD, resting comfortably in bed  Pulmonary: Nonlabored breathing, no respiratory distress  Cardiovascular: NSR  Abdominal: soft, NT/ND  Extremities: WWP, normal strength  Skin: no hematoma, rash, ecchymosis. L groin: gauze covered by tegaderm is 30% saturated with blood. R groin: gauze covered by tegaderm is C/D/I.  Neuro: A/O x 3, CNs II-XII grossly intact, normal motor/sensation, no focal deficits  Pulses:   Right:                                                                          Left:   DP [x]2+ [ ]1+ [x]doppler                                                DP [x]2+ [ ]1+ [x]doppler  PT[x]2+ [ ]1+ [x]doppler                                                  PT [x]2+ [ ]1+ [x]doppler      LABS:                        13.0   16.58 )-----------( 75       ( 10 Jul 2024 11:14 )             36.7     07-10    136  |  104  |  11  ----------------------------<  165<H>  4.1   |  22  |  0.6<L>    Ca    9.2      10 Jul 2024 11:14  Phos  2.7     07-10  Mg     1.9     07-10        CAPILLARY BLOOD GLUCOSE      POCT Blood Glucose.: 159 mg/dL (10 Jul 2024 11:48)  POCT Blood Glucose.: 120 mg/dL (10 Jul 2024 06:18)      Urinalysis Basic - ( 10 Jul 2024 11:14 )    Color: x / Appearance: x / SG: x / pH: x  Gluc: 165 mg/dL / Ketone: x  / Bili: x / Urobili: x   Blood: x / Protein: x / Nitrite: x   Leuk Esterase: x / RBC: x / WBC x   Sq Epi: x / Non Sq Epi: x / Bacteria: x        Radiology and Additional Studies:    Assessment:73y Male s/p above procedure 4 hours after. Denies pain, numbness, tingling. L groin: gauze covered by tegaderm is 30% saturated with blood. R groin: gauze covered by tegaderm is C/D/I. DP, PT pulses palpable 2+ b/l.    Plan:  Pain/nausea control PRN  Home meds  Incentive spirometer  Vitals per protocol  IVF, Diet: clear liquid  AM labs

## 2024-07-10 NOTE — CONSULT NOTE ADULT - ASSESSMENT
73y Male s/p EVAR.     NEUROLOGICAL:  #Acute pain    -APAP prn  #s/p EVAR  - q1hr neurovascular checks   - palpable DP/PT b/l    RESPIRATORY:   #Oxygenation    -wean off NC to RA as tolerated   - encourage IS use   #hx NEEL  - on CPAP @ home at night  #Activity  - lay flat x4 hours postop  - bedrest x24hrs postop  - then can increase as tolerated    CARDS:   #maintain normotension  #hx of HLD  - continue home rosuvastatin 40mg daily  #hx HTN  - continue home enalapril 5mg daily  Imaging:   - Pre-op EKG with sinus bradycardia.   - Post-op EKG NSR.       GASTROINTESTINAL/NUTRITION:   #Diet, Clear Liquid  Diet, Clear Liquid:   Consistent Carbohydrate Evening Snack (CSTCHOSN) [Active]    -aspiration precautions    -can advance to Regular in PM if tolerating  #GI Prophylaxis    -not indicated  #Bowel regimen    -not indicated     /RENAL:   #urine output in critically ill    -indwelling sosa (placed 7/10)  #maintain euvolemia  - LR@75cc/hr    Labs            -f/u postop labs     HEME/ONC:   #DVT prophylaxis     -Chemical: HOLDING per vascular surgery     -Mechanical: SCDs  #holding home ASA until 7/11 per vascular fellow       Labs: Hb/Hct:                       Plts:                  PTT/INR:             ID:  #Antibiotics Course- periop ancef x3 doses  WBC- will f/u post op labs   Temp trend- 24hrs T(F): 97.6 (07-10 @ 10:55), Max: 98.8 (07-10 @ 06:15)  Current antibiotics-ceFAZolin   IVPB 2000 every 8 hours        ENDOCRINE:  #hx of DM    -FS qith ISS    -Glucose goal 140-180    -holding home metformin 500bid    MSK:     Activity - Bedrest:     Additional Instructions:  24hrs postop (07-10-24 @ 11:04)  #increase as tolerated after vascular restrictions    SKIN:  #DTI screening negative    -wound assessment per nursing      LINES/DRAINS:  PIV, Sosa, L radial arterial line  ADVANCED DIRECTIVES:  Full Code  INDICATION FOR SICU/SDU: Abdominal aortic aneurysm (AAA) without rupture  DISPO:   SICU. Case discussed with attending Dr. Saavedra

## 2024-07-10 NOTE — CONSULT NOTE ADULT - ATTENDING COMMENTS
72 y/o male with Abdominal Aortic Aneurism.  S/P EVAR.  Acute postoperative pain.  Atelectasis.  NEEL, on CPAP.  At risk for hemodynamic instability.  DM.    PLAN:  - pain control - on Tylenol, gabapentin  - vascular checks q1h - currently with palpable distal pedal pulses  - incentive spirometer  - use CPAP at night for NEEL  - keep normotensive; on enalapril with parameter  - follow postop ECG  - liquid diet  - follow serum electrolytes and UOP  - ID - universal precautions   - DVT proph  Admit to SICU

## 2024-07-10 NOTE — BRIEF OPERATIVE NOTE - NSICDXBRIEFPROCEDURE_GEN_ALL_CORE_FT
PROCEDURES:  Endovascular repair of non-ruptured infrarenal abdominal aortic aneurysm (AAA) using endograft with closure of femoral artery 10-Jul-2024 10:30:52  Tyler Thomas

## 2024-07-10 NOTE — BRIEF OPERATIVE NOTE - OPERATION/FINDINGS
1. US guided b/l CFA access (16F sheath R, 12F sheath L)  2. Aortobi-iliac angiogram  3. [place] excluder [id number] infrarenal deployment, 14x14 L iliac limb  4. Perclose b/l CFA    Findings: Infrarenal aortic aneurysm without rupture. Final angiogram revealed patent hypogastrics b/l, patent renal arteries b/l, and no evidence of endoleak.    Palpable DP/PT b/l at conclusion of case

## 2024-07-10 NOTE — PATIENT PROFILE ADULT - FALL HARM RISK - HARM RISK INTERVENTIONS

## 2024-07-11 ENCOUNTER — NON-APPOINTMENT (OUTPATIENT)
Age: 74
End: 2024-07-11

## 2024-07-11 ENCOUNTER — TRANSCRIPTION ENCOUNTER (OUTPATIENT)
Age: 74
End: 2024-07-11

## 2024-07-11 VITALS
TEMPERATURE: 99 F | DIASTOLIC BLOOD PRESSURE: 67 MMHG | HEART RATE: 60 BPM | RESPIRATION RATE: 18 BRPM | SYSTOLIC BLOOD PRESSURE: 116 MMHG | OXYGEN SATURATION: 95 %

## 2024-07-11 LAB
GLUCOSE BLDC GLUCOMTR-MCNC: 119 MG/DL — HIGH (ref 70–99)
HCT VFR BLD CALC: 36.7 % — LOW (ref 42–52)
HGB BLD-MCNC: 13.1 G/DL — LOW (ref 14–18)
MCHC RBC-ENTMCNC: 30.5 PG — SIGNIFICANT CHANGE UP (ref 27–31)
MCHC RBC-ENTMCNC: 35.7 G/DL — SIGNIFICANT CHANGE UP (ref 32–37)
MCV RBC AUTO: 85.5 FL — SIGNIFICANT CHANGE UP (ref 80–94)
NRBC # BLD: 0 /100 WBCS — SIGNIFICANT CHANGE UP (ref 0–0)
PLATELET # BLD AUTO: 87 K/UL — LOW (ref 130–400)
PMV BLD: 11.1 FL — HIGH (ref 7.4–10.4)
RBC # BLD: 4.29 M/UL — LOW (ref 4.7–6.1)
RBC # FLD: 12.2 % — SIGNIFICANT CHANGE UP (ref 11.5–14.5)
WBC # BLD: 11.29 K/UL — HIGH (ref 4.8–10.8)
WBC # FLD AUTO: 11.29 K/UL — HIGH (ref 4.8–10.8)

## 2024-07-11 PROCEDURE — 99233 SBSQ HOSP IP/OBS HIGH 50: CPT

## 2024-07-11 PROCEDURE — 93010 ELECTROCARDIOGRAM REPORT: CPT

## 2024-07-11 RX ORDER — ACETAMINOPHEN 325 MG
2 TABLET ORAL
Qty: 0 | Refills: 0 | DISCHARGE
Start: 2024-07-11

## 2024-07-11 RX ORDER — HYDRALAZINE HYDROCHLORIDE 50 MG/1
5 TABLET ORAL ONCE
Refills: 0 | Status: COMPLETED | OUTPATIENT
Start: 2024-07-11 | End: 2024-07-11

## 2024-07-11 RX ORDER — CALCIUM CARBONATE 500(1250)
1 TABLET,CHEWABLE ORAL EVERY 4 HOURS
Refills: 0 | Status: DISCONTINUED | OUTPATIENT
Start: 2024-07-11 | End: 2024-07-11

## 2024-07-11 RX ORDER — POTASSIUM CHLORIDE 600 MG/1
20 TABLET, FILM COATED, EXTENDED RELEASE ORAL ONCE
Refills: 0 | Status: COMPLETED | OUTPATIENT
Start: 2024-07-11 | End: 2024-07-11

## 2024-07-11 RX ADMIN — Medication 1 TABLET(S): at 03:39

## 2024-07-11 RX ADMIN — Medication 1.25 MILLIGRAM(S): at 03:39

## 2024-07-11 RX ADMIN — Medication 5 MILLIGRAM(S): at 06:02

## 2024-07-11 RX ADMIN — HYDRALAZINE HYDROCHLORIDE 5 MILLIGRAM(S): 50 TABLET ORAL at 06:07

## 2024-07-11 RX ADMIN — Medication 1 APPLICATION(S): at 06:02

## 2024-07-11 RX ADMIN — POTASSIUM CHLORIDE 50 MILLIEQUIVALENT(S): 600 TABLET, FILM COATED, EXTENDED RELEASE ORAL at 00:23

## 2024-07-11 NOTE — DISCHARGE NOTE PROVIDER - NSDCMRMEDTOKEN_GEN_ALL_CORE_FT
acetaminophen 325 mg oral tablet: 2 tab(s) orally every 6 hours As needed Mild Pain (1 - 3)  aspirin 81 mg oral tablet: orally once a day  enalapril 5 mg oral tablet: 1 tab(s) orally once a day  metFORMIN 500 mg oral tablet: 1 tab(s) orally 2 times a day  rosuvastatin 40 mg oral tablet: 1 tab(s) orally once a day

## 2024-07-11 NOTE — PROGRESS NOTE ADULT - ATTENDING COMMENTS
POD 1   Patient has palpable distal pedal pulses.  On IS able to do 1.5L  No groin hematomas.    ASSESSMENT:  74 y/o male with Abdominal Aortic Aneurism.  S/P EVAR.  Acute postoperative pain.  Atelectasis.  NEEL, on CPAP.  At risk for hemodynamic instability.  DM.    PLAN:  - pain control - on Tylenol, gabapentin  - intermittent vascular checks  - incentive spirometer  - keep normotensive - on Enalapril with parameter  - regular diet  - follow serum electrolytes and UOP  - ID - universal precautions   - DVT proph

## 2024-07-11 NOTE — DISCHARGE NOTE PROVIDER - NSDCCPCAREPLAN_GEN_ALL_CORE_FT
PRINCIPAL DISCHARGE DIAGNOSIS  Diagnosis: S/P abdominal aortic aneurysm repair  Assessment and Plan of Treatment: SURGERY DISCHARGE INSTRUCTIONS  FOLLOW-UP - with Dr. Salas in 2 weeks. Call the office to make an appointment or if you have any questions/concerns.  DIET - regular.   ACTIVITY- No heavy lifting for 4-6 wks over 10-20 lbs. Walking is encouraged. No running or swimming. No driving while taking pain medication.  WOUNDCARE - Some drainage from your incisions or drain sites is normal. If you have drainage from an open incision or drain site- cover loosely with sterile gauze and tape and change daily. You have clear tape and gauze for dressings which you can remove in 24 hours. You also have small white Steri Strips over the access site. These white strips should stay in place and will fall off on their own.    PAIN MEDS - Take over the counter extra strength tylenol 500mg and/or ibprofen 400mg with food every 6 hours for pain. No more than 4g of tylenol in 24hrs or 1g in 4 hrs.   OTHER MEDS - If you have any questions about your other regular home medications please call your primary care physician or the physician who prescribed those medications to you.   If you develop fever, dizziness, chest pain, trouble breathing, nausea, vomiting, increasing abdominal pain, inability to pass bowel movements, redness/pain/discharge from incisions. Please call the office or go to the emergency room immediately.

## 2024-07-11 NOTE — DISCHARGE NOTE PROVIDER - HOSPITAL COURSE
73M w/ PMHx of DM, thyroid nodule, HTN, HLD, and AAA. Presented to the hospital on 7/10/24 for repair of AAA. He is now s/p EVAR. Pt doing well post-op and denies any pain, motor and sensory in tact. He has palpable DP/PT pulses B/L. Pt sosa was removed 7/11 AM and passed TOV. He is hemodynamically stable and ready to be discharged home.

## 2024-07-11 NOTE — PROGRESS NOTE ADULT - SUBJECTIVE AND OBJECTIVE BOX
GENERAL SURGERY PROGRESS NOTE     ROSE WILSON  [address] [place] [address] day :2d    POD: 1d  Procedure: Endovascular repair of non-ruptured infrarenal abdominal aortic aneurysm (AAA) using endograft with closure of femoral artery      Surgical Attending: Kulwant Salas  Overnight events: Pt had mild saturation of Left groin dressing. Dressing was removed and large clot visualized at access site. Pressure held, no signs of active bleeding or hematoma formation. Motor and Sensory in tact. DP/PT pulses palpable b/l.     T(F): 97.4 (07-11-24 @ 04:00), Max: 97.8 (07-11-24 @ 00:00)  HR: 56 (07-11-24 @ 06:00) (46 - 78)  BP: 185/82 (07-11-24 @ 06:00) (124/57 - 185/82)  ABP: 171/67 (07-11-24 @ 06:00) (137/51 - 179/75)  ABP(mean): 105 (07-11-24 @ 06:00) (81 - 117)  RR: 23 (07-11-24 @ 06:00) (12 - 28)  SpO2: 98% (07-11-24 @ 06:00) (94% - 99%)    IN'S / OUT's:    07-10-24 @ 07:01  -  07-11-24 @ 06:18  --------------------------------------------------------  IN:    Lactated Ringers: 795 mL  Total IN: 795 mL    OUT:    Indwelling Catheter - Urethral (mL): 5295 mL  Total OUT: 5295 mL    Total NET: -4500 mL          PHYSICAL EXAM:  GENERAL: NAD, well-appearing  CHEST/LUNG: Clear to auscultation bilaterally  HEART: Regular rate and rhythm  ABDOMEN: Soft, Nontender, Nondistended;   EXTREMITIES:  No clubbing, cyanosis, or edema, Palpable DP/PT pulses B/L, B/L Groin dressings C/D/I      LABS  Labs:  CAPILLARY BLOOD GLUCOSE      POCT Blood Glucose.: 138 mg/dL (10 Jul 2024 22:14)  POCT Blood Glucose.: 173 mg/dL (10 Jul 2024 17:23)  POCT Blood Glucose.: 159 mg/dL (10 Jul 2024 11:48)                          13.1   11.29 )-----------( 87       ( 10 Jul 2024 23:25 )             36.7         07-10    139  |  105  |  8<L>  ----------------------------<  144<H>  3.7   |  24  |  0.6<L>      Calcium: 9.1 mg/dL (07-10-24 @ 23:25)      LFTs:         Coags:            Urinalysis Basic - ( 10 Jul 2024 23:25 )    Color: x / Appearance: x / SG: x / pH: x  Gluc: 144 mg/dL / Ketone: x  / Bili: x / Urobili: x   Blood: x / Protein: x / Nitrite: x   Leuk Esterase: x / RBC: x / WBC x   Sq Epi: x / Non Sq Epi: x / Bacteria: x            RADIOLOGY & ADDITIONAL TESTS:      A/P:  ROSE WILSON is a 73M w/ PMHx of DM, thyroid nodule, HTN, HLD, and AAA now s/p EVAR on 7/11.        PLAN:   - D/C sosa  - F/U TOV  - Multi modal pain control  - continue ASA 81  - encourage ambulation and IS as tolerated   - Rest of management per SICU        #DVT ppx: aspirin  chewable 81 milliGRAM(s) Oral daily    Disposition:  SICU    Above plan to be discussed with Attending Surgeon Dr. Salas  , patient, patient family, and rest of health care team    [id number]

## 2024-07-11 NOTE — PROGRESS NOTE ADULT - ASSESSMENT
Assessment and Plan:  73y Male s/p EVAR.     NEUROLOGICAL:  #Acute pain    -APAP prn    RESPIRATORY:   #Oxygenation   - saturating well on RA    - encourage IS use   #hx NEEL  - on CPAP @ home at night  #Activity  - lay flat x4 hours postop  - bedrest x24hrs postop  - then can increase as tolerated    CARDS:   #s/p EVAR  - q1hr neurovascular checks   - palpable DP/PT b/l  - maintain normotension  #sinus bradycadia   - EKG: sinus bradycardia qtc 426  #hx of HLD  - continue home rosuvastatin 40mg daily  #hx HTN  - continue home enalapril 5mg daily  Imaging:   - Pre-op EKG with sinus bradycardia.   - Post-op EKG NSR.       GASTROINTESTINAL/NUTRITION:   #Diet, DASH    -aspiration precautions    -HOB 30 degrees  #GI Prophylaxis    -not indicated  #Bowel regimen    -not indicated     /RENAL:   #urine output in critically ill    -indwelling sosa (placed 7/10)   - D/c sosa in AM and TOV   #maintain euvolemia  - LR@75cc/hr    Labs:          BUN/Cr- 11/0.6  -->,  8/0.6  -->          Electrolytes-(07-10 @ 23:25)Na 139 // K 3.7 // Mg 2.1 // Phos 4.0     HEME/ONC:   #DVT prophylaxis     -Chemical: HOLDING per vascular surgery     -Mechanical: SCDs  #holding home ASA until 7/11 per vascular fellow  #thrombocytopenia ?    Labs: Hb/Hct:  13.0/36.7  -->,  13.1/36.7  -->                      Plts:  75  -->,  87  -->                 PTT/INR:         ID:  #  WBC- 16.58  --->>,  11.29  --->>  Temp trend- 24hrs T(F): 97.6 (07-10 @ 20:00), Max: 98.8 (07-10 @ 06:15)  Antibiotics Course- periop ancef x3 doses        ENDOCRINE:  #hx of DM    -FS qith ISS    -Glucose goal 140-180    -holding home metformin 500bid    MSK:     Activity - Bedrest:     Additional Instructions:  24hrs postop (07-10-24 @ 11:04)  #increase as tolerated after vascular restrictions    SKIN:  #DTI screening negative    -wound assessment per nursing      LINES/DRAINS:  PIV, Sosa, L radial arterial line  ADVANCED DIRECTIVES:  Full Code  INDICATION FOR SICU/SDU: Abdominal aortic aneurysm (AAA) without rupture  DISPO:   SICU Assessment and Plan:  73M PMHx HLD, NEEL, thyroid nodule, metformin-dependent DM, and AAA who presented on 7/10 for EVAR, brought to SICU for Q1 postoperative vascular checks.    NEUROLOGICAL:  #Acute pain  - Tylenol PRN    RESPIRATORY:   #Oxygenation  - Saturating well on RA   - Encourage IS use, pulling 4L  #PMHx NEEL  - On CPAP @ home at night, did not use last night (did not tolerate hospital settings)  #Activity  - lay flat x4 hours postop  - bedrest x24hrs postop  - then can increase as tolerated    CARDS:   #s/p EVAR  - q1hr neurovascular checks   - palpable DP/PT b/l  - maintain normotension  #sinus bradycadia   - EKG: sinus bradycardia qtc 426  #hx of HLD  - continue home rosuvastatin 40mg daily  #hx HTN  - continue home enalapril 5mg daily  Imaging:   - Pre-op EKG with sinus bradycardia.   - Post-op EKG NSR.       GASTROINTESTINAL/NUTRITION:   #Diet, DASH    -aspiration precautions    -HOB 30 degrees  #GI Prophylaxis    -not indicated  #Bowel regimen    -not indicated     /RENAL:   #urine output in critically ill    -indwelling sosa (placed 7/10)   - D/c sosa in AM and TOV   #maintain euvolemia  - LR@75cc/hr    Labs:          BUN/Cr- 11/0.6  -->,  8/0.6  -->          Electrolytes-(07-10 @ 23:25)Na 139 // K 3.7 // Mg 2.1 // Phos 4.0     HEME/ONC:   #DVT prophylaxis     -Chemical: HOLDING per vascular surgery     -Mechanical: SCDs  #holding home ASA until 7/11 per vascular fellow  #thrombocytopenia ?    Labs: Hb/Hct:  13.0/36.7  -->,  13.1/36.7  -->                      Plts:  75  -->,  87  -->                 PTT/INR:         ID:  #  WBC- 16.58  --->>,  11.29  --->>  Temp trend- 24hrs T(F): 97.6 (07-10 @ 20:00), Max: 98.8 (07-10 @ 06:15)  Antibiotics Course- periop ancef x3 doses        ENDOCRINE:  #hx of DM    -FS qith ISS    -Glucose goal 140-180    -holding home metformin 500bid    MSK:     Activity - Bedrest:     Additional Instructions:  24hrs postop (07-10-24 @ 11:04)  #increase as tolerated after vascular restrictions    SKIN:  #DTI screening negative    -wound assessment per nursing      LINES/DRAINS:  PIV, Sosa, L radial arterial line  ADVANCED DIRECTIVES:  Full Code  INDICATION FOR SICU/SDU: Abdominal aortic aneurysm (AAA) without rupture  DISPO:   SICU Assessment and Plan:  73M PMHx HLD, NEEL, thyroid nodule, metformin-dependent DM, and AAA who presented on 7/10 for EVAR, brought to SICU for Q1 postoperative vascular checks.    NEUROLOGICAL:  #Acute pain  - Tylenol PRN    RESPIRATORY:   #Oxygenation  - Saturating well on RA   - Encourage IS use, pulling 4L  #PMHx NEEL  - On CPAP @ home at night, did not use last night (did not tolerate hospital settings)  #Activity  - Ambulate as tolerated    CARDS:   #S/p EVAR  - Q1hr neurovascular checks   - DP/PT bilaterally palpable  - Maintain normotension (SBP < 160)  #Sinus bradycadia   - Postoperative EKG: sinus bradycardia   #PMHx HLD  - Continue home rosuvastatin 40mg QD  #PMHx HTN  - Continue home enalapril 5mg QD  Imaging:   - Pre-op EKG with sinus bradycardia  - Post-op EKG NSR      GASTROINTESTINAL/NUTRITION:   #Diet, DASH  - Aspiration precautions  - HOB 30 degrees  #GI Prophylaxis  - Not indicated  #Bowel regimen  - Not indicated     /RENAL:   #Urine output in critically ill  - Muñoz removed this AM, voiding spontaneously  #Maintain euvolemia  - IVL    Labs:          BUN/Cr- 11/0.6  -->,  8/0.6  -->          Electrolytes-(07-10 @ 23:25)Na 139 // K 3.7 // Mg 2.1 // Phos 4.0   07-10    139  |  105  |  8<L>  ----------------------------<  144<H>  3.7   |  24  |  0.6<L>    Ca    9.1      10 Jul 2024 23:25  Phos  4.0     07-10  Mg     2.1     07-10      HEME/ONC:   #DVT prophylaxis  - Okay to resume chemical ppx per vascular surgery  - Mechanical: SCDs  - Okay to resume ASA per vascular fellow  #Thrombocytopenia    Labs: Hb/Hct:  13.0/36.7  -->,  13.1/36.7                     Plts:  75  -->,  87                          13.1   11.29 )-----------( 87       ( 10 Jul 2024 23:25 )             36.7     ID:  #Postoperative management  WBC- 16.58  --->>,  11.29  --->>  Temp trend- 24hrs T(F): 97.6 (07-10 @ 20:00), Max: 98.8 (07-10 @ 06:15)  - Antibiotics Course- periop ancef x3 doses, completed    ENDOCRINE:  #PMHx DM  - FS with ISS  - Glucose goal 140-180  - Holding home metformin 500mg BID    MSK:     Activity - Bedrest:   - Additional Instructions:  24hrs postop (07-10-24 @ 11:04)  #increase as tolerated after vascular restrictions    SKIN:  #DTI screening negative  - Wound assessment per nursing      LINES/DRAINS:  PIV, L radial arterial line  ADVANCED DIRECTIVES:  Full Code  INDICATION FOR SICU/SDU: Abdominal aortic aneurysm (AAA) without rupture  DISPO:  D/c home per vascular  Discussed with and approved by SICU attending, Dr. Saavedra.

## 2024-07-11 NOTE — CHART NOTE - NSCHARTNOTEFT_GEN_A_CORE
VASCULAR SURGERY PROGRESS NOTE    CC:   Hospital Day #2  Post-Op Day #1    Procedure: S/P EVAR using endograft with closure of femoral artery    Events of past 24 hours: No acute events overnight. Dressing of the left lesion changed when soaked in blood. Large clot was found under the dressing.       PAST MEDICAL & SURGICAL HISTORY:     Diabetes  Thyroid nodule  High cholesterol  H/O abdominal aortic aneurysm  History of hernia repair  Bilateral inguinal hernia repair      Vital Signs Last 24 Hrs  T(C): 36.4 (10 Jul 2024 20:00), Max: 37.1 (10 Jul 2024 06:15)  T(F): 97.6 (10 Jul 2024 20:00), Max: 98.8 (10 Jul 2024 06:15)  HR: 64 (10 Jul 2024 23:00) (51 - 78)  BP: 161/93 (10 Jul 2024 23:00) (124/57 - 170/80)  BP(mean): 122 (10 Jul 2024 23:00) (95 - 122)  RR: 23 (10 Jul 2024 23:00) (12 - 28)  SpO2: 95% (10 Jul 2024 23:00) (94% - 99%)    Parameters below as of 10 Jul 2024 20:00  Patient On (Oxygen Delivery Method): room air      Pain Control Adequate: YES     Diet: DASH/TLC    I&O's Detail    10 Jul 2024 07:01  -  11 Jul 2024 00:31  --------------------------------------------------------  IN:    Lactated Ringers: 375 mL  Total IN: 375 mL    OUT:    Indwelling Catheter - Urethral (mL): 3745 mL  Total OUT: 3745 mL    Total NET: -3370 mL    PHYSICAL EXAM  Appearance: Normal	  HEENT:   Normal oral mucosa, PERRL, EOMI	  Neck: Supple, - JVD; Carotid Bruit   Cardiovascular: Normal S1 S2, No JVD, No murmurs,   Respiratory: Chest rise equal bilaterally, no labored breathing, Lungs clear to auscultation/Decreased Breath Sounds/No Rales, Rhonchi, Wheezing	  Gastrointestinal:  Soft, Non-tender, + BS	  Skin: No rashes, No ecchymoses, No cyanosis  Extremities: Normal range of motion, No clubbing, cyanosis or edema  Neurologic: Non-focal  Psychiatry: A & O x 3, Mood & affect appropriate    PULSES:  Femoral: Palpable  Popliteal: Palpable  Dorsal Pedal: Palpable  Posterior Tibial: Palpable  Capillary: +      MEDICATIONS:   MEDICATIONS  (STANDING):  aspirin  chewable 81 milliGRAM(s) Oral daily  chlorhexidine 2% Cloths 1 Application(s) Topical <User Schedule>  enalapril 5 milliGRAM(s) Oral daily  insulin lispro (ADMELOG) corrective regimen sliding scale   SubCutaneous Before meals and at bedtime  lactated ringers. 1000 milliLiter(s) (75 mL/Hr) IV Continuous <Continuous>  rosuvastatin 20 milliGRAM(s) Oral at bedtime    MEDICATIONS  (PRN):  acetaminophen     Tablet .. 650 milliGRAM(s) Oral every 6 hours PRN Mild Pain (1 - 3)  calcium carbonate    500 mG (Tums) Chewable 1 Tablet(s) Chew every 6 hours PRN Dyspepsia  enalaprilat Injectable 1.25 milliGRAM(s) IV Push once PRN SBP > 160      DVT PROPHYLAXIS: [] YES [] NO  GI PROPHYLAXIS: [] YES [] NO  ANTIPLATELETS: [] YES [] NO  ANTICOAGULATION: [] YES [] NO  ANTIBIOTICS: [] YES [] NO    LAB/STUDIES:                        13.1   11.29 )-----------( 87       ( 10 Jul 2024 23:25 )             36.7     07-10    139  |  105  |  8<L>  ----------------------------<  144<H>  3.7   |  24  |  0.6<L>    Ca    9.1      10 Jul 2024 23:25  Phos  4.0     07-10  Mg     2.1     07-10      Urinalysis Basic - ( 10 Jul 2024 23:25 )    Color: x / Appearance: x / SG: x / pH: x  Gluc: 144 mg/dL / Ketone: x  / Bili: x / Urobili: x   Blood: x / Protein: x / Nitrite: x   Leuk Esterase: x / RBC: x / WBC x   Sq Epi: x / Non Sq Epi: x / Bacteria:         Plan:  - Check on the patient dressing.  - Monitor vitals, peripheral pulses and urine output.   - Adequate pain management.   - F/U Ultrasound before DC  - Discharge on antiplatelet therapy.      VASCULAR SURGERY SPECTRA: 3183 VASCULAR SURGERY PROGRESS NOTE    CC:   Hospital Day #2  Post-Op Day #1    Procedure: S/P EVAR using endograft with closure of femoral artery    Events of past 24 hours: No acute events overnight. Dressing of the left lesion changed when soaked in blood. Large clot was found under the dressing.       PAST MEDICAL & SURGICAL HISTORY:     Diabetes  Thyroid nodule  High cholesterol  H/O abdominal aortic aneurysm  History of hernia repair  Bilateral inguinal hernia repair      Vital Signs Last 24 Hrs  T(C): 36.4 (10 Jul 2024 20:00), Max: 37.1 (10 Jul 2024 06:15)  T(F): 97.6 (10 Jul 2024 20:00), Max: 98.8 (10 Jul 2024 06:15)  HR: 64 (10 Jul 2024 23:00) (51 - 78)  BP: 161/93 (10 Jul 2024 23:00) (124/57 - 170/80)  BP(mean): 122 (10 Jul 2024 23:00) (95 - 122)  RR: 23 (10 Jul 2024 23:00) (12 - 28)  SpO2: 95% (10 Jul 2024 23:00) (94% - 99%)    Parameters below as of 10 Jul 2024 20:00  Patient On (Oxygen Delivery Method): room air      Pain Control Adequate: YES     Diet: DASH/TLC    I&O's Detail    10 Jul 2024 07:01  -  11 Jul 2024 00:31  --------------------------------------------------------  IN:    Lactated Ringers: 375 mL  Total IN: 375 mL    OUT:    Indwelling Catheter - Urethral (mL): 3745 mL  Total OUT: 3745 mL    Total NET: -3370 mL    PHYSICAL EXAM  Appearance: Normal	  HEENT:   Normal oral mucosa, PERRL, EOMI	  Neck: Supple, - JVD; Carotid Bruit   Cardiovascular: Normal S1 S2, No JVD, No murmurs,   Respiratory: Chest rise equal bilaterally, no labored breathing, Lungs clear to auscultation/Decreased Breath Sounds/No Rales, Rhonchi, Wheezing	  Gastrointestinal:  Soft, Non-tender, + BS	  Skin: No rashes, No ecchymoses, No cyanosis  Extremities: Normal range of motion, No clubbing, cyanosis or edema  Neurologic: Non-focal  Psychiatry: A & O x 3, Mood & affect appropriate    PULSES:  Femoral: Palpable  Popliteal: Palpable  Dorsal Pedal: Palpable  Posterior Tibial: Palpable  Capillary: +      MEDICATIONS:   MEDICATIONS  (STANDING):  aspirin  chewable 81 milliGRAM(s) Oral daily  chlorhexidine 2% Cloths 1 Application(s) Topical <User Schedule>  enalapril 5 milliGRAM(s) Oral daily  insulin lispro (ADMELOG) corrective regimen sliding scale   SubCutaneous Before meals and at bedtime  lactated ringers. 1000 milliLiter(s) (75 mL/Hr) IV Continuous <Continuous>  rosuvastatin 20 milliGRAM(s) Oral at bedtime    MEDICATIONS  (PRN):  acetaminophen     Tablet .. 650 milliGRAM(s) Oral every 6 hours PRN Mild Pain (1 - 3)  calcium carbonate    500 mG (Tums) Chewable 1 Tablet(s) Chew every 6 hours PRN Dyspepsia  enalaprilat Injectable 1.25 milliGRAM(s) IV Push once PRN SBP > 160      DVT PROPHYLAXIS: [] YES [] NO  GI PROPHYLAXIS: [] YES [] NO  ANTIPLATELETS: [] YES [] NO  ANTICOAGULATION: [] YES [] NO  ANTIBIOTICS: [] YES [] NO    LAB/STUDIES:                        13.1   11.29 )-----------( 87       ( 10 Jul 2024 23:25 )             36.7     07-10    139  |  105  |  8<L>  ----------------------------<  144<H>  3.7   |  24  |  0.6<L>    Ca    9.1      10 Jul 2024 23:25  Phos  4.0     07-10  Mg     2.1     07-10      Urinalysis Basic - ( 10 Jul 2024 23:25 )    Color: x / Appearance: x / SG: x / pH: x  Gluc: 144 mg/dL / Ketone: x  / Bili: x / Urobili: x   Blood: x / Protein: x / Nitrite: x   Leuk Esterase: x / RBC: x / WBC x   Sq Epi: x / Non Sq Epi: x / Bacteria:         Plan:  - Monitor incision site.  - Remove sosa catheter.   - Restart aspirin.    - Monitor vitals, peripheral pulses and urine output.   - Adequate pain management.   - DVT PPX.   - Rest of care per SICU.      VASCULAR SURGERY SPECTRA: [id number]

## 2024-07-11 NOTE — DISCHARGE NOTE NURSING/CASE MANAGEMENT/SOCIAL WORK - PATIENT PORTAL LINK FT
You can access the FollowMyHealth Patient Portal offered by [facility] by registering at the following website: [email or web address]. By joining [employer]’s FollowMyHealth portal, you will also be able to view your health information using other applications (apps) compatible with our system.

## 2024-07-11 NOTE — PROGRESS NOTE ADULT - SUBJECTIVE AND OBJECTIVE BOX
ROSE WILSON   [id number]   [birth year]    73yM  ============================================================   DATE OF INITIAL SICU/SDU CONSULT: 07-10-24    INDICATION FOR SICU CONSULT:  q1hr neurovascular checks s/p EVAR     SICU COURSE EVENTS :  07-10 - admitted to SICU service     24HOUR EVENTS  -Admission under SICU service  - palpable DP/PT b/l     [X] A ten-point review of systems was negative except as expressed in note.  [X] History was obtained from patient. If unable to participate in their care, history obtained from review of the chart and collateral sources of information.  ============================================================   PAST MEDICAL & SURGICAL HISTORY  Diabetes  History of thyroid nodule  Thyroid nodule  Hypertension  High cholesterol  H/O abdominal aortic aneurysm      HOME MEDICATIONS    aspirin 81 mg oral tablet: orally  enalapril 5 mg oral tablet: 1 tab(s) orally once a day  metFORMIN 500 mg oral tablet: 1 tab(s) orally 2 times a day  rosuvastatin 40 mg oral tablet: 1 tab(s) orally      ALLERGIES  Allergies  No Known Allergies  Intolerances.     ----------------------------------------------------------------------------------------------------------  Tubes/Lines/Drains    [x] Peripheral IV    [x] Urinary Catheter Muñoz  [ ]Present on Admission          Insertion Date:     7/10/24                              [ ] Central Venous Line       [ ]IJ             [ ]Femoral     [ ]Subclavian   [ ]Left  [ ]Right      Insertion Date:          [x] Arterial Line 	                [x]Radial    [ ]Femoral     [ ]Axillary         [x]Left  [ ]Right      Insertion Date:    7/10/24       ==========================================================================================================================================   ROSE WILSON   [id number]   [birth year]    73yM  ============================================================   DATE OF INITIAL SICU/SDU CONSULT: 07-10-24    INDICATION FOR SICU CONSULT:  q1hr neurovascular checks s/p EVAR     SICU COURSE EVENTS :  07-10 - admitted to SICU service     24HOUR EVENTS  -Admission under SICU service  - palpable DP/PT b/l      CURRENT MEDS:   Neurologic Medications  acetaminophen     Tablet .. 650 milliGRAM(s) Oral every 6 hours PRN Mild Pain (1 - 3)    Respiratory Medications    Cardiovascular Medications  enalapril 5 milliGRAM(s) Oral daily    Gastrointestinal Medications  calcium carbonate    500 mG (Tums) Chewable 1 Tablet(s) Chew every 4 hours PRN Dyspepsia    Genitourinary Medications    Hematologic/Oncologic Medications  aspirin  chewable 81 milliGRAM(s) Oral daily    Antimicrobial/Immunologic Medications    Endocrine/Metabolic Medications  insulin lispro (ADMELOG) corrective regimen sliding scale   SubCutaneous Before meals and at bedtime  rosuvastatin 20 milliGRAM(s) Oral at bedtime    Topical/Other Medications  chlorhexidine 2% Cloths 1 Application(s) Topical <User Schedule>  ICU Vital Signs Last 24 Hrs  T(C): 37 (11 Jul 2024 10:50), Max: 37 (11 Jul 2024 10:50)  T(F): 98.6 (11 Jul 2024 10:50), Max: 98.6 (11 Jul 2024 10:50)  HR: 60 (11 Jul 2024 10:50) (46 - 77)  BP: 116/67 (11 Jul 2024 10:50) (116/67 - 185/82)  BP(mean): 90 (11 Jul 2024 10:00) (90 - 124)  ABP: 132/47 (11 Jul 2024 10:00) (132/47 - 179/75)  ABP(mean): 73 (11 Jul 2024 10:00) (73 - 117)  RR: 18 (11 Jul 2024 10:50) (12 - 30)  SpO2: 95% (11 Jul 2024 10:50) (94% - 99%)    O2 Parameters below as of 11 Jul 2024 10:50  Patient On (Oxygen Delivery Method): room air            I&O's Summary    10 Jul 2024 07:01  -  11 Jul 2024 07:00  --------------------------------------------------------  IN: 795 mL / OUT: 5295 mL / NET: -4500 mL    11 Jul 2024 07:01  -  11 Jul 2024 11:41  --------------------------------------------------------  IN: 0 mL / OUT: 350 mL / NET: -350 mL    I&O's Detail    10 Jul 2024 07:01  -  11 Jul 2024 07:00  --------------------------------------------------------  IN:    Lactated Ringers: 795 mL  Total IN: 795 mL    OUT:    Indwelling Catheter - Urethral (mL): 5295 mL    Voided (mL): 0 mL  Total OUT: 5295 mL    Total NET: -4500 mL      11 Jul 2024 07:01  -  11 Jul 2024 11:41  --------------------------------------------------------  IN:  Total IN: 0 mL    OUT:    Voided (mL): 350 mL  Total OUT: 350 mL    Total NET: -350 mL          PHYSICAL EXAM:    NEURO: A&Ox3, no focal deficits  PULM: Saturating well on RA, pulling 4L on IS, equal chest rise bilaterally  CV: Regular rate and rhythm, bilaterally palpable DP and PTs, groin incisions soft, no erythema or swelling, no peripheral edema  GI: Abdomen soft, non-tender, non-distended  MSK: Extremities warm, moving all extremities without limitations  DERM: No skin breakdown noted  : Muñoz out      [X] A ten-point review of systems was negative except as expressed in note.  [X] History was obtained from patient. If unable to participate in their care, history obtained from review of the chart and collateral sources of information.  ============================================================   PAST MEDICAL & SURGICAL HISTORY  Diabetes  History of thyroid nodule  Thyroid nodule  Hypertension  High cholesterol  H/O abdominal aortic aneurysm      HOME MEDICATIONS    aspirin 81 mg oral tablet: orally  enalapril 5 mg oral tablet: 1 tab(s) orally once a day  metFORMIN 500 mg oral tablet: 1 tab(s) orally 2 times a day  rosuvastatin 40 mg oral tablet: 1 tab(s) orally      ALLERGIES  Allergies  No Known Allergies  Intolerances.     ----------------------------------------------------------------------------------------------------------  Tubes/Lines/Drains    [x] Peripheral IV    [ ] Urinary Catheter Muñoz  [ ]Present on Admission          Insertion Date:     7/10/24                              [ ] Central Venous Line       [ ]IJ             [ ]Femoral     [ ]Subclavian   [ ]Left  [ ]Right      Insertion Date:          [x] Arterial Line 	                [x]Radial    [ ]Femoral     [ ]Axillary         [x]Left  [ ]Right      Insertion Date:    7/10/24       ==========================================================================================================================================

## 2024-07-11 NOTE — DISCHARGE NOTE PROVIDER - CARE PROVIDERS DIRECT ADDRESSES
How Severe Is Your Skin Lesion?: mild Have Your Skin Lesions Been Treated?: not been treated Is This A New Presentation, Or A Follow-Up?: Skin Lesions ,[email or web address]

## 2024-07-15 PROBLEM — Z86.79 PERSONAL HISTORY OF OTHER DISEASES OF THE CIRCULATORY SYSTEM: Chronic | Status: ACTIVE | Noted: 2024-06-26

## 2024-07-15 PROBLEM — E78.00 PURE HYPERCHOLESTEROLEMIA, UNSPECIFIED: Chronic | Status: ACTIVE | Noted: 2024-06-26

## 2024-07-19 DIAGNOSIS — R00.1 BRADYCARDIA, UNSPECIFIED: ICD-10-CM

## 2024-07-19 DIAGNOSIS — Z79.84 LONG TERM (CURRENT) USE OF ORAL HYPOGLYCEMIC DRUGS: ICD-10-CM

## 2024-07-19 DIAGNOSIS — Z79.82 LONG TERM (CURRENT) USE OF ASPIRIN: ICD-10-CM

## 2024-07-19 DIAGNOSIS — G47.33 OBSTRUCTIVE SLEEP APNEA (ADULT) (PEDIATRIC): ICD-10-CM

## 2024-07-19 DIAGNOSIS — I71.43 INFRARENAL ABDOMINAL AORTIC ANEURYSM, WITHOUT RUPTURE: ICD-10-CM

## 2024-07-19 DIAGNOSIS — J98.11 ATELECTASIS: ICD-10-CM

## 2024-07-19 DIAGNOSIS — D69.6 THROMBOCYTOPENIA, UNSPECIFIED: ICD-10-CM

## 2024-07-19 DIAGNOSIS — I10 ESSENTIAL (PRIMARY) HYPERTENSION: ICD-10-CM

## 2024-07-19 DIAGNOSIS — E78.00 PURE HYPERCHOLESTEROLEMIA, UNSPECIFIED: ICD-10-CM

## 2024-07-19 DIAGNOSIS — E11.9 TYPE 2 DIABETES MELLITUS WITHOUT COMPLICATIONS: ICD-10-CM

## 2024-07-19 DIAGNOSIS — E04.1 NONTOXIC SINGLE THYROID NODULE: ICD-10-CM

## 2024-07-25 ENCOUNTER — APPOINTMENT (OUTPATIENT)
Dept: VASCULAR SURGERY | Facility: CLINIC | Age: 74
End: 2024-07-25
Payer: MEDICARE

## 2024-07-25 VITALS — DIASTOLIC BLOOD PRESSURE: 79 MMHG | SYSTOLIC BLOOD PRESSURE: 137 MMHG

## 2024-07-25 DIAGNOSIS — I71.43 INFRARENAL ABDOMINAL AORTIC ANEURYSM, WITHOUT RUPTURE: ICD-10-CM

## 2024-07-25 PROCEDURE — 99024 POSTOP FOLLOW-UP VISIT: CPT

## 2024-07-25 NOTE — HISTORY OF PRESENT ILLNESS
[FreeTextEntry1] : 74 y/o M with h/o DM, found to have AAA during hernia surgery and underwent EVAR with GORE excluder device on 7/10/24, presents for post operative follow up, no complaints.

## 2024-07-25 NOTE — ASSESSMENT
[FreeTextEntry1] : 74 y/o M with h/o DM, found to have AAA during hernia surgery and underwent EVAR with GORE excluder device on 7/10/24.  Healed bilateral groin incisions.  Follow up in six weeks for aortic duplex.   I, Dr. Kulwant Salas, personally performed the evaluation and management (E/M) services for this established patient who presents today with (a) new problem(s)/exacerbation of (an) existing condition(s). That E/M includes conducting the clinically appropriate interval history &/or exam, assessing all new/exacerbated conditions, and establishing a new plan of care. Today, my CAROL, Priscilla Carlos PA-C, was here to observe my evaluation and management service for this new problem/exacerbated condition and follow the plan of care established by me going forward.

## 2024-09-05 ENCOUNTER — APPOINTMENT (OUTPATIENT)
Dept: VASCULAR SURGERY | Facility: CLINIC | Age: 74
End: 2024-09-05
Payer: MEDICARE

## 2024-09-05 VITALS
WEIGHT: 165 LBS | HEIGHT: 68 IN | BODY MASS INDEX: 25.01 KG/M2 | SYSTOLIC BLOOD PRESSURE: 136 MMHG | DIASTOLIC BLOOD PRESSURE: 74 MMHG

## 2024-09-05 DIAGNOSIS — I71.43 INFRARENAL ABDOMINAL AORTIC ANEURYSM, WITHOUT RUPTURE: ICD-10-CM

## 2024-09-05 PROCEDURE — 93978 VASCULAR STUDY: CPT

## 2024-09-05 PROCEDURE — 99024 POSTOP FOLLOW-UP VISIT: CPT

## 2024-09-05 NOTE — DATA REVIEWED
[FreeTextEntry1] : Patent endovascular graft visualized in the abdominal aorta.  Active flow visualized in the residual aneurysm sac consistent with the presence of endoleak.  Residual aneurysm sac 7.2 cm.  Right diffuse fibrocalcific plaque noted in the aortoiliac arteries with no evidence of hemodynamically significant disease.  Left diffuse fibrocalcific plaque noted throughout the aortic iliac arteries with no evidence of hemodynamically significant disease

## 2024-09-05 NOTE — ASSESSMENT
[FreeTextEntry1] : 74 y/o M with h/o DM, found to have AAA during hernia surgery and underwent EVAR with GORE excluder device on 7/10/24.  I performed an aortic duplex today that shows no significant change in the patient's sac size.  There is a positive endoleak visualized.  I would like to continue to monitor him conservatively.  I will see him back in my office in 6 months time with a repeat aortic duplex exam.  Healed bilateral groin incisions.    I, Dr. Kulwant Salas, personally performed the evaluation and management (E/M) services for this established patient who presents today with (a) new problem(s)/exacerbation of (an) existing condition(s). That E/M includes conducting the clinically appropriate interval history &/or exam, assessing all new/exacerbated conditions, and establishing a new plan of care. Today, my CAROL, Ragini Tan PA-C, was here to observe my evaluation and management service for this new problem/exacerbated condition and follow the plan of care established by me going forward.

## 2025-01-20 ENCOUNTER — INPATIENT (INPATIENT)
Facility: HOSPITAL | Age: 75
LOS: 1 days | Discharge: HOME | DRG: 379 | End: 2025-01-22
Attending: HOSPITALIST | Admitting: FAMILY MEDICINE
Payer: MEDICARE

## 2025-01-20 VITALS
OXYGEN SATURATION: 100 % | TEMPERATURE: 98 F | SYSTOLIC BLOOD PRESSURE: 129 MMHG | RESPIRATION RATE: 18 BRPM | HEIGHT: 67 IN | WEIGHT: 164.91 LBS | DIASTOLIC BLOOD PRESSURE: 72 MMHG | HEART RATE: 68 BPM

## 2025-01-20 DIAGNOSIS — Z98.890 OTHER SPECIFIED POSTPROCEDURAL STATES: Chronic | ICD-10-CM

## 2025-01-20 DIAGNOSIS — K62.5 HEMORRHAGE OF ANUS AND RECTUM: ICD-10-CM

## 2025-01-20 LAB
ALBUMIN SERPL ELPH-MCNC: 4.1 G/DL — SIGNIFICANT CHANGE UP (ref 3.5–5.2)
ALP SERPL-CCNC: 45 U/L — SIGNIFICANT CHANGE UP (ref 30–115)
ALT FLD-CCNC: 12 U/L — SIGNIFICANT CHANGE UP (ref 0–41)
ANION GAP SERPL CALC-SCNC: 11 MMOL/L — SIGNIFICANT CHANGE UP (ref 7–14)
ANION GAP SERPL CALC-SCNC: 12 MMOL/L — SIGNIFICANT CHANGE UP (ref 7–14)
APTT BLD: 18.2 SEC — CRITICAL LOW (ref 27–39.2)
AST SERPL-CCNC: 23 U/L — SIGNIFICANT CHANGE UP (ref 0–41)
BASOPHILS # BLD AUTO: 0.04 K/UL — SIGNIFICANT CHANGE UP (ref 0–0.2)
BASOPHILS NFR BLD AUTO: 0.4 % — SIGNIFICANT CHANGE UP (ref 0–1)
BILIRUB SERPL-MCNC: 0.4 MG/DL — SIGNIFICANT CHANGE UP (ref 0.2–1.2)
BLD GP AB SCN SERPL QL: SIGNIFICANT CHANGE UP
BUN SERPL-MCNC: 15 MG/DL — SIGNIFICANT CHANGE UP (ref 10–20)
BUN SERPL-MCNC: 17 MG/DL — SIGNIFICANT CHANGE UP (ref 10–20)
CALCIUM SERPL-MCNC: 9.3 MG/DL — SIGNIFICANT CHANGE UP (ref 8.4–10.5)
CALCIUM SERPL-MCNC: 9.4 MG/DL — SIGNIFICANT CHANGE UP (ref 8.4–10.5)
CHLORIDE SERPL-SCNC: 101 MMOL/L — SIGNIFICANT CHANGE UP (ref 98–110)
CHLORIDE SERPL-SCNC: 103 MMOL/L — SIGNIFICANT CHANGE UP (ref 98–110)
CO2 SERPL-SCNC: 25 MMOL/L — SIGNIFICANT CHANGE UP (ref 17–32)
CO2 SERPL-SCNC: 25 MMOL/L — SIGNIFICANT CHANGE UP (ref 17–32)
CREAT SERPL-MCNC: 0.6 MG/DL — LOW (ref 0.7–1.5)
CREAT SERPL-MCNC: 0.7 MG/DL — SIGNIFICANT CHANGE UP (ref 0.7–1.5)
EGFR: 100 ML/MIN/1.73M2 — SIGNIFICANT CHANGE UP
EGFR: 95 ML/MIN/1.73M2 — SIGNIFICANT CHANGE UP
EOSINOPHIL # BLD AUTO: 0.02 K/UL — SIGNIFICANT CHANGE UP (ref 0–0.7)
EOSINOPHIL NFR BLD AUTO: 0.2 % — SIGNIFICANT CHANGE UP (ref 0–8)
GLUCOSE BLDC GLUCOMTR-MCNC: 122 MG/DL — HIGH (ref 70–99)
GLUCOSE SERPL-MCNC: 109 MG/DL — HIGH (ref 70–99)
GLUCOSE SERPL-MCNC: 120 MG/DL — HIGH (ref 70–99)
HCT VFR BLD CALC: 27 % — LOW (ref 42–52)
HCT VFR BLD CALC: 28.7 % — LOW (ref 42–52)
HCT VFR BLD CALC: 30.4 % — LOW (ref 42–52)
HGB BLD-MCNC: 10.5 G/DL — LOW (ref 14–18)
HGB BLD-MCNC: 9.5 G/DL — LOW (ref 14–18)
HGB BLD-MCNC: 9.9 G/DL — LOW (ref 14–18)
IMM GRANULOCYTES NFR BLD AUTO: 0.3 % — SIGNIFICANT CHANGE UP (ref 0.1–0.3)
INR BLD: 1.01 RATIO — SIGNIFICANT CHANGE UP (ref 0.65–1.3)
LYMPHOCYTES # BLD AUTO: 2.38 K/UL — SIGNIFICANT CHANGE UP (ref 1.2–3.4)
LYMPHOCYTES # BLD AUTO: 21.9 % — SIGNIFICANT CHANGE UP (ref 20.5–51.1)
MCHC RBC-ENTMCNC: 29.6 PG — SIGNIFICANT CHANGE UP (ref 27–31)
MCHC RBC-ENTMCNC: 30.1 PG — SIGNIFICANT CHANGE UP (ref 27–31)
MCHC RBC-ENTMCNC: 30.1 PG — SIGNIFICANT CHANGE UP (ref 27–31)
MCHC RBC-ENTMCNC: 34.5 G/DL — SIGNIFICANT CHANGE UP (ref 32–37)
MCHC RBC-ENTMCNC: 34.5 G/DL — SIGNIFICANT CHANGE UP (ref 32–37)
MCHC RBC-ENTMCNC: 35.2 G/DL — SIGNIFICANT CHANGE UP (ref 32–37)
MCV RBC AUTO: 85.4 FL — SIGNIFICANT CHANGE UP (ref 80–94)
MCV RBC AUTO: 85.7 FL — SIGNIFICANT CHANGE UP (ref 80–94)
MCV RBC AUTO: 87.1 FL — SIGNIFICANT CHANGE UP (ref 80–94)
MONOCYTES # BLD AUTO: 0.63 K/UL — HIGH (ref 0.1–0.6)
MONOCYTES NFR BLD AUTO: 5.8 % — SIGNIFICANT CHANGE UP (ref 1.7–9.3)
NEUTROPHILS # BLD AUTO: 7.76 K/UL — HIGH (ref 1.4–6.5)
NEUTROPHILS NFR BLD AUTO: 71.4 % — SIGNIFICANT CHANGE UP (ref 42.2–75.2)
NRBC # BLD: 0 /100 WBCS — SIGNIFICANT CHANGE UP (ref 0–0)
NRBC BLD-RTO: 0 /100 WBCS — SIGNIFICANT CHANGE UP (ref 0–0)
PLATELET # BLD AUTO: 72 K/UL — LOW (ref 130–400)
PLATELET # BLD AUTO: 82 K/UL — LOW (ref 130–400)
PLATELET # BLD AUTO: 94 K/UL — LOW (ref 130–400)
PMV BLD: 12 FL — HIGH (ref 7.4–10.4)
PMV BLD: 12.1 FL — HIGH (ref 7.4–10.4)
PMV BLD: 12.4 FL — HIGH (ref 7.4–10.4)
POTASSIUM SERPL-MCNC: 4.5 MMOL/L — SIGNIFICANT CHANGE UP (ref 3.5–5)
POTASSIUM SERPL-MCNC: 5.5 MMOL/L — HIGH (ref 3.5–5)
POTASSIUM SERPL-SCNC: 4.5 MMOL/L — SIGNIFICANT CHANGE UP (ref 3.5–5)
POTASSIUM SERPL-SCNC: 5.5 MMOL/L — HIGH (ref 3.5–5)
PROT SERPL-MCNC: 6.3 G/DL — SIGNIFICANT CHANGE UP (ref 6–8)
PROTHROM AB SERPL-ACNC: 11.9 SEC — SIGNIFICANT CHANGE UP (ref 9.95–12.87)
RBC # BLD: 3.16 M/UL — LOW (ref 4.7–6.1)
RBC # BLD: 3.35 M/UL — LOW (ref 4.7–6.1)
RBC # BLD: 3.49 M/UL — LOW (ref 4.7–6.1)
RBC # FLD: 12.9 % — SIGNIFICANT CHANGE UP (ref 11.5–14.5)
RBC # FLD: 13 % — SIGNIFICANT CHANGE UP (ref 11.5–14.5)
RBC # FLD: 13.2 % — SIGNIFICANT CHANGE UP (ref 11.5–14.5)
SODIUM SERPL-SCNC: 138 MMOL/L — SIGNIFICANT CHANGE UP (ref 135–146)
SODIUM SERPL-SCNC: 139 MMOL/L — SIGNIFICANT CHANGE UP (ref 135–146)
WBC # BLD: 10.86 K/UL — HIGH (ref 4.8–10.8)
WBC # BLD: 8.57 K/UL — SIGNIFICANT CHANGE UP (ref 4.8–10.8)
WBC # BLD: 9.18 K/UL — SIGNIFICANT CHANGE UP (ref 4.8–10.8)
WBC # FLD AUTO: 10.86 K/UL — HIGH (ref 4.8–10.8)
WBC # FLD AUTO: 8.57 K/UL — SIGNIFICANT CHANGE UP (ref 4.8–10.8)
WBC # FLD AUTO: 9.18 K/UL — SIGNIFICANT CHANGE UP (ref 4.8–10.8)

## 2025-01-20 PROCEDURE — 99222 1ST HOSP IP/OBS MODERATE 55: CPT | Mod: FS

## 2025-01-20 PROCEDURE — 83036 HEMOGLOBIN GLYCOSYLATED A1C: CPT

## 2025-01-20 PROCEDURE — 74174 CTA ABD&PLVS W/CONTRAST: CPT | Mod: MC

## 2025-01-20 PROCEDURE — 99285 EMERGENCY DEPT VISIT HI MDM: CPT | Mod: FS

## 2025-01-20 PROCEDURE — 80053 COMPREHEN METABOLIC PANEL: CPT

## 2025-01-20 PROCEDURE — 85027 COMPLETE CBC AUTOMATED: CPT

## 2025-01-20 PROCEDURE — 88305 TISSUE EXAM BY PATHOLOGIST: CPT

## 2025-01-20 PROCEDURE — 82962 GLUCOSE BLOOD TEST: CPT

## 2025-01-20 PROCEDURE — 36415 COLL VENOUS BLD VENIPUNCTURE: CPT

## 2025-01-20 PROCEDURE — 80048 BASIC METABOLIC PNL TOTAL CA: CPT

## 2025-01-20 PROCEDURE — 74174 CTA ABD&PLVS W/CONTRAST: CPT | Mod: 26

## 2025-01-20 RX ORDER — ENALAPRIL MALEATE 20 MG
5 TABLET ORAL DAILY
Refills: 0 | Status: DISCONTINUED | OUTPATIENT
Start: 2025-01-22 | End: 2025-01-22

## 2025-01-20 RX ORDER — SENNOSIDES 8.6 MG
2 TABLET ORAL AT BEDTIME
Refills: 0 | Status: DISCONTINUED | OUTPATIENT
Start: 2025-01-20 | End: 2025-01-22

## 2025-01-20 RX ORDER — GLUCAGON 3 MG/1
1 POWDER NASAL ONCE
Refills: 0 | Status: DISCONTINUED | OUTPATIENT
Start: 2025-01-20 | End: 2025-01-22

## 2025-01-20 RX ORDER — ROSUVASTATIN CALCIUM 10 MG/1
40 TABLET, FILM COATED ORAL AT BEDTIME
Refills: 0 | Status: DISCONTINUED | OUTPATIENT
Start: 2025-01-20 | End: 2025-01-22

## 2025-01-20 RX ORDER — BACTERIOSTATIC SODIUM CHLORIDE 0.9 %
1000 VIAL (ML) INJECTION
Refills: 0 | Status: DISCONTINUED | OUTPATIENT
Start: 2025-01-20 | End: 2025-01-22

## 2025-01-20 RX ORDER — ACETAMINOPHEN 160 MG/5ML
650 SUSPENSION ORAL EVERY 6 HOURS
Refills: 0 | Status: DISCONTINUED | OUTPATIENT
Start: 2025-01-20 | End: 2025-01-22

## 2025-01-20 RX ORDER — INSULIN LISPRO 100/ML
VIAL (ML) SUBCUTANEOUS
Refills: 0 | Status: DISCONTINUED | OUTPATIENT
Start: 2025-01-20 | End: 2025-01-22

## 2025-01-20 RX ORDER — SODIUM CHLORIDE 9 G/ML
1000 INJECTION, SOLUTION INTRAVENOUS
Refills: 0 | Status: DISCONTINUED | OUTPATIENT
Start: 2025-01-20 | End: 2025-01-22

## 2025-01-20 RX ORDER — DM/PSEUDOEPHED/ACETAMINOPHEN 10-30-250
15 CAPSULE ORAL ONCE
Refills: 0 | Status: DISCONTINUED | OUTPATIENT
Start: 2025-01-20 | End: 2025-01-22

## 2025-01-20 RX ORDER — ONDANSETRON 4 MG/1
4 TABLET, ORALLY DISINTEGRATING ORAL EVERY 6 HOURS
Refills: 0 | Status: DISCONTINUED | OUTPATIENT
Start: 2025-01-20 | End: 2025-01-22

## 2025-01-20 RX ORDER — DM/PSEUDOEPHED/ACETAMINOPHEN 10-30-250
25 CAPSULE ORAL ONCE
Refills: 0 | Status: DISCONTINUED | OUTPATIENT
Start: 2025-01-20 | End: 2025-01-22

## 2025-01-20 RX ORDER — PANTOPRAZOLE 20 MG/1
40 TABLET, DELAYED RELEASE ORAL
Refills: 0 | Status: DISCONTINUED | OUTPATIENT
Start: 2025-01-20 | End: 2025-01-22

## 2025-01-20 NOTE — H&P ADULT - HISTORY OF PRESENT ILLNESS
Patient is 74-year-old male with hx of HTN, HLD, DM2, and colonic polyp presenting to the ED with blood in the stool.   Patient had a colonoscopy on the 15th.  Patient had 3 polyps removed and was told he had hemorrhoids.  Patient states last night he started to have bloody bowel movement.  Patient had 2 last night and 1 this morning.  Patient went to follow-up in his gastroenterologist office today and was told to come to the ED for further evaluation and admission    reports abdm pain this am, which resolved now.  afebrile.  Offers no other complaints

## 2025-01-20 NOTE — ED ADULT NURSE NOTE - IN THE PAST 12 MONTHS HAVE YOU USED DRUGS OTHER THAN THOSE REQUIRED FOR MEDICAL REASON?
Colonoscopy and EGD scheduled for 03/082023. Surgery Scheduling form faxed to Surgery. Prep instructions given to patient and spouse. EKG order given to patient and spouse. No

## 2025-01-20 NOTE — H&P ADULT - ASSESSMENT
Patient is 74-year-old male with hx of HTN, HLD, DM2, and colonic polyp presenting with       #GIB  -Will obtain abdm CT scan  -clears  -PPI  -GI consult   -Monitor H/H    #HTN/HLD  -Continue with home medications  -Monitor BP    #DM2  -Hold oral medications  -ISS  -Monitor POC    #Progress Note Handoff  Pending (specify):  as above   Family discussion:  plan of care was discussed with patient and family in details.  all questions were answered.  seems to understand, and in agreement  Disposition: home

## 2025-01-20 NOTE — ED PROVIDER NOTE - OBJECTIVE STATEMENT
74-year-old male presents to the ED for evaluation.  Patient had a colonoscopy on the 15th.  Patient had 3 polyps removed and was told he had hemorrhoids.  Patient states last night he started to have bloody bowel movement.  Patient had 2 last night and 1 this morning.  Patient went to follow-up in his gastroenterologist office today and was told to come to the ED for further evaluation and admission

## 2025-01-20 NOTE — ED PROVIDER NOTE - CLINICAL SUMMARY MEDICAL DECISION MAKING FREE TEXT BOX
74-year-old recent colonoscopy on the 15th coming here for postop bleeding that started last night.  No bleeding after procedure.  Had multiple polyps removed.  Benign exam deferred rectal exam.  Saw GI in the clinic and was told to come in for further evaluation.  Labs done.  Hemoglobin drop 3 points from baseline.  Will admit for GI eval/colonoscopy.    I personally evaluated patient. I agree with the findings and plan with all documentation on chart except as documented  in my note.      ED work up reviewed and results and plan of care discussed with patient. Patient requires admission for further work up, monitoring, and management. Need for admission discussed with patient.

## 2025-01-20 NOTE — H&P ADULT - NSHPPHYSICALEXAM_GEN_ALL_CORE
Vital Signs Last 24 Hrs  T(C): 36.6 (20 Jan 2025 16:24), Max: 36.6 (20 Jan 2025 16:24)  T(F): 97.8 (20 Jan 2025 16:24), Max: 97.8 (20 Jan 2025 16:24)  HR: 68 (20 Jan 2025 16:24) (68 - 68)  BP: 129/72 (20 Jan 2025 16:24) (129/72 - 129/72)  BP(mean): --  RR: 18 (20 Jan 2025 16:24) (18 - 18)  SpO2: 100% (20 Jan 2025 16:24) (100% - 100%)    Parameters below as of 20 Jan 2025 16:24  Patient On (Oxygen Delivery Method): room air      PHYSICAL EXAM-  GENERAL: NAD, well-groomed, well-developed  HEAD:  Atraumatic, Normocephalic  EYES: EOMI, PERRLA, conjunctiva and sclera clear  NECK: Supple, No JVD, Normal thyroid  NERVOUS SYSTEM:  Alert & Oriented X3, Motor Strength 5/5 B/L upper and lower extremities; DTRs 2+ intact and symmetric  CHEST/LUNG: Clear to percussion bilaterally; No rales, rhonchi, wheezing, or rubs  HEART: Regular rate and rhythm; No murmurs, rubs, or gallops  ABDOMEN: Soft, Nontender, Nondistended; Bowel sounds present  EXTREMITIES:  2+ Peripheral Pulses, No clubbing, cyanosis, or edema  SKIN: No rashes or lesions

## 2025-01-20 NOTE — PATIENT PROFILE ADULT - FALL HARM RISK - HARM RISK INTERVENTIONS

## 2025-01-21 LAB
A1C WITH ESTIMATED AVERAGE GLUCOSE RESULT: 6 % — HIGH (ref 4–5.6)
ALBUMIN SERPL ELPH-MCNC: 3.5 G/DL — SIGNIFICANT CHANGE UP (ref 3.5–5.2)
ALP SERPL-CCNC: 41 U/L — SIGNIFICANT CHANGE UP (ref 30–115)
ALT FLD-CCNC: 10 U/L — SIGNIFICANT CHANGE UP (ref 0–41)
ANION GAP SERPL CALC-SCNC: 12 MMOL/L — SIGNIFICANT CHANGE UP (ref 7–14)
AST SERPL-CCNC: 13 U/L — SIGNIFICANT CHANGE UP (ref 0–41)
BILIRUB SERPL-MCNC: 0.3 MG/DL — SIGNIFICANT CHANGE UP (ref 0.2–1.2)
BUN SERPL-MCNC: 10 MG/DL — SIGNIFICANT CHANGE UP (ref 10–20)
CALCIUM SERPL-MCNC: 8.9 MG/DL — SIGNIFICANT CHANGE UP (ref 8.4–10.5)
CHLORIDE SERPL-SCNC: 104 MMOL/L — SIGNIFICANT CHANGE UP (ref 98–110)
CO2 SERPL-SCNC: 24 MMOL/L — SIGNIFICANT CHANGE UP (ref 17–32)
CREAT SERPL-MCNC: 0.7 MG/DL — SIGNIFICANT CHANGE UP (ref 0.7–1.5)
EGFR: 95 ML/MIN/1.73M2 — SIGNIFICANT CHANGE UP
ESTIMATED AVERAGE GLUCOSE: 126 MG/DL — HIGH (ref 68–114)
GLUCOSE BLDC GLUCOMTR-MCNC: 104 MG/DL — HIGH (ref 70–99)
GLUCOSE BLDC GLUCOMTR-MCNC: 107 MG/DL — HIGH (ref 70–99)
GLUCOSE BLDC GLUCOMTR-MCNC: 108 MG/DL — HIGH (ref 70–99)
GLUCOSE BLDC GLUCOMTR-MCNC: 121 MG/DL — HIGH (ref 70–99)
GLUCOSE SERPL-MCNC: 201 MG/DL — HIGH (ref 70–99)
HCT VFR BLD CALC: 26 % — LOW (ref 42–52)
HGB BLD-MCNC: 8.9 G/DL — LOW (ref 14–18)
MCHC RBC-ENTMCNC: 30 PG — SIGNIFICANT CHANGE UP (ref 27–31)
MCHC RBC-ENTMCNC: 34.2 G/DL — SIGNIFICANT CHANGE UP (ref 32–37)
MCV RBC AUTO: 87.5 FL — SIGNIFICANT CHANGE UP (ref 80–94)
NRBC # BLD: 0 /100 WBCS — SIGNIFICANT CHANGE UP (ref 0–0)
NRBC BLD-RTO: 0 /100 WBCS — SIGNIFICANT CHANGE UP (ref 0–0)
PLATELET # BLD AUTO: 76 K/UL — LOW (ref 130–400)
PMV BLD: 12 FL — HIGH (ref 7.4–10.4)
POTASSIUM SERPL-MCNC: 4.9 MMOL/L — SIGNIFICANT CHANGE UP (ref 3.5–5)
POTASSIUM SERPL-SCNC: 4.9 MMOL/L — SIGNIFICANT CHANGE UP (ref 3.5–5)
PROT SERPL-MCNC: 5.5 G/DL — LOW (ref 6–8)
RBC # BLD: 2.97 M/UL — LOW (ref 4.7–6.1)
RBC # FLD: 12.9 % — SIGNIFICANT CHANGE UP (ref 11.5–14.5)
SODIUM SERPL-SCNC: 140 MMOL/L — SIGNIFICANT CHANGE UP (ref 135–146)
WBC # BLD: 6.44 K/UL — SIGNIFICANT CHANGE UP (ref 4.8–10.8)
WBC # FLD AUTO: 6.44 K/UL — SIGNIFICANT CHANGE UP (ref 4.8–10.8)

## 2025-01-21 PROCEDURE — 99223 1ST HOSP IP/OBS HIGH 75: CPT

## 2025-01-21 PROCEDURE — 99233 SBSQ HOSP IP/OBS HIGH 50: CPT

## 2025-01-21 RX ORDER — ACETAMINOPHEN, DIPHENHYDRAMINE HCL, PHENYLEPHRINE HCL 325; 25; 5 MG/1; MG/1; MG/1
3 TABLET ORAL AT BEDTIME
Refills: 0 | Status: DISCONTINUED | OUTPATIENT
Start: 2025-01-21 | End: 2025-01-22

## 2025-01-21 RX ORDER — MAGNESIUM, ALUMINUM HYDROXIDE 200-225/5
30 SUSPENSION, ORAL (FINAL DOSE FORM) ORAL EVERY 4 HOURS
Refills: 0 | Status: DISCONTINUED | OUTPATIENT
Start: 2025-01-21 | End: 2025-01-22

## 2025-01-21 RX ORDER — POLYETHYLENE GLYCOL 3350 420 G/4L
4000 POWDER, FOR SOLUTION ORAL ONCE
Refills: 0 | Status: COMPLETED | OUTPATIENT
Start: 2025-01-21 | End: 2025-01-21

## 2025-01-21 RX ADMIN — PANTOPRAZOLE 40 MILLIGRAM(S): 20 TABLET, DELAYED RELEASE ORAL at 05:21

## 2025-01-21 RX ADMIN — ROSUVASTATIN CALCIUM 40 MILLIGRAM(S): 10 TABLET, FILM COATED ORAL at 22:14

## 2025-01-21 RX ADMIN — POLYETHYLENE GLYCOL 3350 4000 MILLILITER(S): 420 POWDER, FOR SOLUTION ORAL at 16:11

## 2025-01-21 RX ADMIN — Medication 70 MILLILITER(S): at 05:21

## 2025-01-21 NOTE — CONSULT NOTE ADULT - CONSULT REQUESTED BY NAME
CHIEF CONCERN: Ingrown Toenail (Left great toe, ingrown toenail consult, no diabetes)         HPI:    Yolie Brambila is a 73 y.o. female with concerns of painful toenail on the left hallux.    The pain started months ago.  Pain aggravated by direct pressure and close toe shoes.   Patient denies acute trauma to the toe.    Home treatment:  clipping      Patient Active Problem List   Diagnosis    Chronic allergic rhinitis    Depression    FHx: colon cancer    CARYL (obstructive sleep apnea)    Migraine equivalent syndrome    Idiopathic peripheral neuropathy    Deviated septum    Hypertrophy of nasal turbinates    Lumbar spondylosis    Lumbar radiculopathy    Restless leg    Pain    Decreased range of motion    Muscle weakness    Essential hypertension    Trochanteric bursitis of both hips    Snoring    Intractable migraine without aura and without status migrainosus    Senile osteoporosis    Vitamin D deficiency    B12 deficiency    Other nonthrombocytopenic purpura    Recurrent major depressive disorder, in full remission           Current Outpatient Medications on File Prior to Visit   Medication Sig Dispense Refill    alendronate (FOSAMAX) 70 MG tablet TAKE 1 TABLET(70 MG) BY MOUTH EVERY 7 DAYS 12 tablet 3    amitriptyline (ELAVIL) 10 MG tablet TAKE 1 TABLET(10 MG) BY MOUTH EVERY EVENING 30 tablet 6    citalopram (CELEXA) 10 MG tablet Take 1 tablet (10 mg total) by mouth once daily. 90 tablet 3    ergocalciferol (ERGOCALCIFEROL) 50,000 unit Cap TAKE 1 CAPSULE BY MOUTH EVERY 7 DAYS 12 capsule 4    losartan (COZAAR) 50 MG tablet TAKE 1 TABLET(50 MG) BY MOUTH EVERY DAY 90 tablet 1    pregabalin (LYRICA) 100 MG capsule Take 1 capsule (100 mg total) by mouth 2 (two) times daily. 60 capsule 2    RESTASIS 0.05 % ophthalmic emulsion Place 1 drop into both eyes 2 (two) times daily.      rOPINIRole (REQUIP) 0.25 MG tablet TAKE 2 TABLETS BY MOUTH EVERY EVENING 180 tablet 3    butalbital-acetaminophen-caffeine -40 mg  "(FIORICET, ESGIC) -40 mg per tablet 1 tab PO PRN once daily for migraine. No more than 10 tab per month. (Patient not taking: Reported on 8/2/2024) 10 tablet 0    HYDROcodone-acetaminophen (NORCO) 5-325 mg per tablet Take 1 tablet by mouth every 8 (eight) hours as needed for Pain. (Patient not taking: Reported on 8/2/2024) 15 tablet 0    ondansetron (ZOFRAN-ODT) 4 MG TbDL DISSOLVE 1 TABLET(4 MG) ON THE TONGUE EVERY 6 HOURS AS NEEDED FOR NAUSEA (Patient not taking: Reported on 8/2/2024) 30 tablet 11    rimegepant (NURTEC) 75 mg odt Take 1 tablet (75 mg total) by mouth daily as needed. Place ODT tablet on the tongue; alternatively the ODT tablet may be placed under the tongue (Patient not taking: Reported on 8/2/2024) 8 tablet 11    triamcinolone (NASACORT) 55 mcg nasal inhaler 1 spray by Nasal route once daily. (Patient not taking: Reported on 8/2/2024)       Current Facility-Administered Medications on File Prior to Visit   Medication Dose Route Frequency Provider Last Rate Last Admin    cyanocobalamin injection 1,000 mcg  1,000 mcg Intramuscular Q28 Days Td Montana MD   1,000 mcg at 08/29/24 0943            Review of patient's allergies indicates:   Allergen Reactions    Penicillins Shortness Of Breath           ROS:  General ROS: negative for chills, fatigue or fever  Cardiovascular ROS: no chest pain or dyspnea on exertion  Musculoskeletal ROS: negative for - joint pain or joint stiffness.  Negative for loss of strength  Neuro ROS: Negative for syncope, numbness, or muscle weakness  Skin ROS: Negative for rash, itching or hair changes.  +Toenail changes        EXAM:   Vitals:    09/16/24 0952   Weight: 62.1 kg (136 lb 14.5 oz)   Height: 5' 4" (1.626 m)       Left LOWER EXTREMITY EXAM:    Vascular:    Dorsalis pedis and posterior tibial pulses are palpable. capillary refill time is within normal limits   Toes are warm to touch.    There is  presence of digital hair.    There is  no localized edema to " primary team the affected toe.     Neurological:    Light touch, proprioception, and sharp/dull sensation are all intact.   Mulders click:  Absent  Tinels:  Absent.   No LOPS    Dermatological:    The toenail is incurvated, Medial border of the hallux.      mild erythema noted to the affected area.     Absent paronychia.     Absent abscess      Musculoskeletal:    Muscle strength is 5/5 in all groups .    No swelling/crepitus at the interphalangeal joints.        ASSESSMENT/PLAN     1. Pain of left great toe    2. Ingrown left big toenail  Comments:  nail care,  prevention  and proper footwear discussed  Orders:  -     Ambulatory referral/consult to Podiatry    Other orders  -     Nail Removal           I counseled the patient on the patient's  conditions, their implications and medical management.       Nail Removal    Date/Time: 9/16/2024 9:40 AM    Performed by: Rick Huitron DPM  Authorized by: Rick Huitron DPM    Consent Done?:  Yes (Written)  Time out: Immediately prior to the procedure a time out was called    Prep: patient was prepped and draped in usual sterile fashion    Location:     Location:  Left foot    Location detail:  Left big toe  Anesthesia:     Anesthesia:  Digital block    Local anesthetic:  Lidocaine 1% without epinephrine and bupivacaine 0.5% without epinephrine    Anesthetic total (ml):  5  Procedure Details:     Preparation:  Skin prepped with ChloraPrep    Amount removed:  Partial    Side:  Medial    Wedge excision of skin of nail fold: No      Nail bed sutured?: No      Nail matrix removed:  Partial    Removal method:  Phenol and alcohol    Removed nail replaced and anchored: No      Dressing applied:  4x4 and antibiotic ointment    Patient tolerance:  Patient tolerated the procedure well with no immediate complications    Discussed risks and potential complications with Ms. Brambila. She is amenable and written consent was obtained.     Verbal and written post operative instructions were  provided to the patient.     Patient to monitor for any adverse reactions and follow up in 10-14 days post op if needed.      Rick Huitron DPM

## 2025-01-21 NOTE — PROGRESS NOTE ADULT - SUBJECTIVE AND OBJECTIVE BOX
ROSE WILSON  74y, Male  Allergy: No Known Allergies      CHIEF COMPLAINT: GIB (21 Jan 2025 12:52)      HPI:  Patient is 74-year-old male with hx of HTN, HLD, DM2, and colonic polyp presenting to the ED with blood in the stool.   Patient had a colonoscopy on the 15th.  Patient had 3 polyps removed and was told he had hemorrhoids.  Patient states last night he started to have bloody bowel movement.  Patient had 2 last night and 1 this morning.  Patient went to follow-up in his gastroenterologist office today and was told to come to the ED for further evaluation and admission    reports abdm pain this am, which resolved now.  afebrile.  Offers no other complaints  (20 Jan 2025 18:04)    HPI:    FAMILY HISTORY:  FH: type 2 diabetes (Father)      PAST MEDICAL & SURGICAL HISTORY:  Diabetes      Thyroid nodule      High cholesterol      H/O abdominal aortic aneurysm      History of hernia repair  bilateral inguinal hernia repair          SOCIAL HISTORY  Social History:      Home Medications:  acetaminophen 325 mg oral tablet: 2 tab(s) orally every 6 hours As needed Mild Pain (1 - 3) (11 Jul 2024 06:34)  aspirin 81 mg oral tablet: orally once a day (11 Jul 2024 06:34)  enalapril 5 mg oral tablet: 1 tab(s) orally once a day (10 Jul 2024 06:14)  metFORMIN 500 mg oral tablet: 1 tab(s) orally 2 times a day (10 Jul 2024 06:14)  rosuvastatin 40 mg oral tablet: 1 tab(s) orally once a day (11 Jul 2024 06:34)      ROS  General: Denies fevers, chills, nightsweats, weight loss  HEENT: Denies headache, rhinorrhea, sore throat, eye pain  CV: Denies CP, palpitations  PULM: Denies SOB, cough  GI: Denies abdominal pain, diarrhea  : Denies dysuria, hematuria  MSK: Denies arthralgias  SKIN: Denies rash   NEURO: Denies paresthesias, weakness  PSYCH: Denies depression    VITALS:  T(F): 97.3, Max: 98 (01-20-25 @ 20:00)  HR: 70  BP: 147/74  RR: 18Vital Signs Last 24 Hrs  T(C): 36.3 (21 Jan 2025 05:05), Max: 36.7 (20 Jan 2025 20:00)  T(F): 97.3 (21 Jan 2025 05:05), Max: 98 (20 Jan 2025 20:00)  HR: 70 (21 Jan 2025 05:05) (68 - 73)  BP: 147/74 (21 Jan 2025 05:05) (129/72 - 147/74)  BP(mean): --  RR: 18 (21 Jan 2025 05:05) (18 - 20)  SpO2: 93% (21 Jan 2025 05:05) (93% - 100%)    Parameters below as of 20 Jan 2025 20:00  Patient On (Oxygen Delivery Method): room air        PHYSICAL EXAM:  Gen: NAD, resting in bed  HEENT: Normocephalic, atraumatic  Neck: supple, no lymphadenopathy  CV: Regular rate & regular rhythm  Lungs: CTABL no wheeze  Abdomen: Soft, NTND+ BS present  Ext: Warm, well perfused no CCE  Neuro: non focal, awake, CN II-XII intact   Skin: no rash, no erythema  Psych: no SI, HI, Hallucination     TESTS & MEASUREMENTS:                        8.9    6.44  )-----------( 74       ( 21 Jan 2025 09:18 )             26.0     01-21    140  |  104  |  10  ----------------------------<  201[H]  4.9   |  24  |  0.7    Ca    8.9      21 Jan 2025 09:18    TPro  5.5[L]  /  Alb  3.5  /  TBili  0.3  /  DBili  x   /  AST  13  /  ALT  10  /  AlkPhos  41  01-21      LIVER FUNCTIONS - ( 21 Jan 2025 09:18 )  Alb: 3.5 g/dL / Pro: 5.5 g/dL / ALK PHOS: 41 U/L / ALT: 10 U/L / AST: 13 U/L / GGT: x           Urinalysis Basic - ( 21 Jan 2025 09:18 )    Color: x / Appearance: x / SG: x / pH: x  Gluc: 201 mg/dL / Ketone: x  / Bili: x / Urobili: x   Blood: x / Protein: x / Nitrite: x   Leuk Esterase: x / RBC: x / WBC x   Sq Epi: x / Non Sq Epi: x / Bacteria: x            QRS axis to [] ° and NSR at a rate of [] BPM. There was no atrial enlargement. There was no ventricular hypertrophy. There were no ST-T changes and all intervals were normal.      INFECTIOUS DISEASES TESTING      RADIOLOGY & ADDITIONAL TESTS:  I have personally reviewed the last Chest xray  CXR      CT      CARDIOLOGY TESTING      MEDICATIONS  (STANDING):  dextrose 5%. 1000 milliLiter(s) (50 mL/Hr) IV Continuous <Continuous>  dextrose 50% Injectable 25 Gram(s) IV Push once  glucagon  Injectable 1 milliGRAM(s) IntraMuscular once  insulin lispro (ADMELOG) corrective regimen sliding scale   SubCutaneous three times a day before meals  pantoprazole    Tablet 40 milliGRAM(s) Oral before breakfast  polyethylene glycol/electrolyte Solution. 4000 milliLiter(s) Oral once  rosuvastatin 40 milliGRAM(s) Oral at bedtime  sodium chloride 0.9%. 1000 milliLiter(s) (70 mL/Hr) IV Continuous <Continuous>    MEDICATIONS  (PRN):  acetaminophen     Tablet .. 650 milliGRAM(s) Oral every 6 hours PRN Temp greater or equal to 38C (100.4F), Mild Pain (1 - 3)  aluminum hydroxide/magnesium hydroxide/simethicone Suspension 30 milliLiter(s) Oral every 4 hours PRN Dyspepsia  dextrose Oral Gel 15 Gram(s) Oral once PRN Blood Glucose LESS THAN 70 milliGRAM(s)/deciliter  melatonin 3 milliGRAM(s) Oral at bedtime PRN Insomnia  ondansetron Injectable 4 milliGRAM(s) IV Push every 6 hours PRN Nausea  senna 2 Tablet(s) Oral at bedtime PRN Constipation      ANTIBIOTICS:      All available historical data has been reviewed    ASSESSMENT  74y M admitted with Hemorrhage of rectum and anus        PROBLEMS

## 2025-01-21 NOTE — CONSULT NOTE ADULT - ASSESSMENT
74y Male pmh HTN, HLD, DM2 presents for blood in stool starting yesterday morning. Patient reports he had a few episodes that stopped by last night. Patient with no bms today, passing flatus. To note patient had Colonoscopy done with Dr. Narayanan 1/15/2025 with polypectomies performed    Colonoscopy 1/15/2024 Dr. Narayanan  Transverse Colon polyp 1cm polypectomy done using bipolar snare polypectomy   Ascending Colon two sessile polyp 0.2cm and 0.8cm, Large polp was bleeding and a clip was applied   Cecum-sessile polyp 1.4cm polypectomy done using saline assisted technique, bipolar snare polypectomy  hemorrhoids second degree  diverticulosis     #Anemia Recent Colonoscopy presents for rectal bleeding--->resolved  r/o post polypectomy bleed  Rec  -Colonoscopy tomorrow, will prep today  -updated Dr. Narayanan   -Maintain Hemodynamic Stability   -Monitor CBC  -CMP,Optimize Electrolytes  -PT,PTT,INR  -EKG, Chest-Xray   -Transfuse prn to hgb >8  -Two large bore IV lines  -ppi ppx  -Monitor Vital Signs  -Monitor Stool For blood, frequency, consistency, melena  -Active Type and Screen  -Iron Studies, Folate, Vitamin B12 levels     #Interval placement of an aorto biiliac stent graft.  Rec  - Care as per primary team     #Enhancing indeterminate left adrenal gland nodule, unchanged.   Recommend   -outpatient MRI or CT adrenal gland with and without contrast.

## 2025-01-21 NOTE — CONSULT NOTE ADULT - NS ATTEND AMEND GEN_ALL_CORE FT
agree w/ the above - pt w/ post-polypectomy bleeding after colonoscopy w/ removal of 3 polyps was performed ~1 w ago. HD stable but had a significant drop in Hb causing a presyncopal episode prior to admission. currently has no bleeding and Hb is stable.   clear liquid diet today, golytely prep tonight, npo after midnight for colonoscopy tomorrow. Rest of recommendations per the note above.     Time-based billing (NON-critical care).   75 minutes spent on total encounter; more than 50% of the visit was spent counseling and / or coordinating care by the attending physician.  The necessity of the time spent during the encounter on this date of service was due to: Coordination of care.

## 2025-01-21 NOTE — CONSULT NOTE ADULT - SUBJECTIVE AND OBJECTIVE BOX
Chief complaint/Reason for consult: post polypectomy bleed    HPI:  Patient is 74-year-old male with hx of HTN, HLD, DM2, and colonic polyp presenting to the ED with blood in the stool.   Patient had a colonoscopy on the 15th.  Patient had 3 polyps removed and was told he had hemorrhoids.  Patient states last night he started to have bloody bowel movement.  Patient had 2 last night and 1 this morning.  Patient went to follow-up in his gastroenterologist office today and was told to come to the ED for further evaluation and admission    reports abdm pain this am, which resolved now.  afebrile.  Offers no other complaints  (20 Jan 2025 18:04)    GI update: 74y Male pmh HTN, HLD, DM2 presents for blood in stool starting yesterday morning. Patient reports he had a few episodes that stopped by last night. Patient with no bms today, passing flatus. To note patient had Colonoscopy done with Dr. Narayanan 1/15/2025 with polypectomies performed. Currently Patient denies nausea, vomiting, hematemesis, melena, blood in stool, diarrhea, constipation, abdominal pain.      PAST MEDICAL & SURGICAL HISTORY:   Diabetes      Thyroid nodule      High cholesterol      H/O abdominal aortic aneurysm      History of hernia repair  bilateral inguinal hernia repair            Family history:  FAMILY HISTORY:  FH: type 2 diabetes (Father)      No GI cancers in first or second degree relatives    Social History: No smoking. No alcohol. No illegal drug use.    Allergies:   No Known Allergies  Intolerances      MEDICATIONS: Home Medications:  acetaminophen 325 mg oral tablet: 2 tab(s) orally every 6 hours As needed Mild Pain (1 - 3) (11 Jul 2024 06:34)  aspirin 81 mg oral tablet: orally once a day (11 Jul 2024 06:34)  enalapril 5 mg oral tablet: 1 tab(s) orally once a day (10 Jul 2024 06:14)  metFORMIN 500 mg oral tablet: 1 tab(s) orally 2 times a day (10 Jul 2024 06:14)  rosuvastatin 40 mg oral tablet: 1 tab(s) orally once a day (11 Jul 2024 06:34)      MEDICATIONS  (STANDING):  dextrose 5%. 1000 milliLiter(s) (50 mL/Hr) IV Continuous <Continuous>  dextrose 50% Injectable 25 Gram(s) IV Push once  glucagon  Injectable 1 milliGRAM(s) IntraMuscular once  insulin lispro (ADMELOG) corrective regimen sliding scale   SubCutaneous three times a day before meals  pantoprazole    Tablet 40 milliGRAM(s) Oral before breakfast  rosuvastatin 40 milliGRAM(s) Oral at bedtime  sodium chloride 0.9%. 1000 milliLiter(s) (70 mL/Hr) IV Continuous <Continuous>    MEDICATIONS  (PRN):  acetaminophen     Tablet .. 650 milliGRAM(s) Oral every 6 hours PRN Temp greater or equal to 38C (100.4F), Mild Pain (1 - 3)  aluminum hydroxide/magnesium hydroxide/simethicone Suspension 30 milliLiter(s) Oral every 4 hours PRN Dyspepsia  dextrose Oral Gel 15 Gram(s) Oral once PRN Blood Glucose LESS THAN 70 milliGRAM(s)/deciliter  melatonin 3 milliGRAM(s) Oral at bedtime PRN Insomnia  ondansetron Injectable 4 milliGRAM(s) IV Push every 6 hours PRN Nausea  senna 2 Tablet(s) Oral at bedtime PRN Constipation        REVIEW OF SYSTEMS  General:  No weight loss, fevers, or chills.  Eyes:  No reported pain or visual changes  ENT:  No sore throat or runny nose.  NECK: No stiffness   CV:  No chest pain or palpitations.  Resp:  No shortness of breath, cough, wheezing or hemoptysis  GI:  No abdominal pain, nausea, vomiting, dysphagia, diarrhea or constipation. +blood in stool no melena, or hematemesis.  Neuro:  No tingling, numbness       VITALS:   T(F): 97.3 (01-21-25 @ 05:05), Max: 98 (01-20-25 @ 20:00)  HR: 70 (01-21-25 @ 05:05) (68 - 73)  BP: 147/74 (01-21-25 @ 05:05) (129/72 - 147/74)  RR: 18 (01-21-25 @ 05:05) (18 - 20)  SpO2: 93% (01-21-25 @ 05:05) (93% - 100%)    PHYSICAL EXAM:  GENERAL: AAOx3, no acute distress.  HEAD:  Atraumatic, Normocephalic  EYES: conjunctiva and sclera clear  NECK: Supple, No thyromegaly   CHEST/LUNG: Clear to auscultation bilaterally; No wheeze, rhonchi, or rales  HEART: Regular rate and rhythm; normal S1, S2, No murmurs.  ABDOMEN: Soft, nontender, nondistended; Bowel sounds present  NEUROLOGY: No asterixis or tremor  SKIN: Intact, no jaundice          LABS:  01-21    140  |  104  |  10  ----------------------------<  201[H]  4.9   |  24  |  0.7    Ca    8.9      21 Jan 2025 09:18    TPro  5.5[L]  /  Alb  3.5  /  TBili  0.3  /  DBili  x   /  AST  13  /  ALT  10  /  AlkPhos  41  01-21                          8.9    6.44  )-----------( 74       ( 21 Jan 2025 09:18 )             26.0     LIVER FUNCTIONS - ( 21 Jan 2025 09:18 )  Alb: 3.5 g/dL / Pro: 5.5 g/dL / ALK PHOS: 41 U/L / ALT: 10 U/L / AST: 13 U/L / GGT: x           PT/INR - ( 20 Jan 2025 16:41 )   PT: 11.90 sec;   INR: 1.01 ratio         PTT - ( 20 Jan 2025 16:41 )  PTT:18.2 sec    IMAGING:    < from: CT Angio Abdomen and Pelvis w/ IV Cont (01.20.25 @ 19:55) >    ACC: [id number] EXAM:  CT ANGIO ABD PELV (W)AW IC   ORDERED BY: VERONICA VU     PROCEDURE DATE:  01/20/2025          INTERPRETATION:  CTA abdomen and pelvis with and without IV contrast    CLINICAL HISTORY/REASON FOR EXAM: GI bleed status post scope, evaluation   for perforation.    TECHNIQUE: CTA abdomen and pelvis with and without IV contrast   (gastrointestinal bleeding protocol). Contiguous axial CTA images of the   abdomen and pelvis were obtained before and after the administration of  95 cc of Omnipaque 350 intravenous contrast. Arterial and venous phase   images obtained. Oral contrast was not administered. Reformatted images   in the coronal and sagittal planes were acquired.    COMPARISON: CTA abdomen and pelvis 6/3/2024      FINDINGS:    LOWER CHEST: Bibasilar reticular changes.    HEPATOBILIARY: Fatty changes of the liver.    SPLEEN: Unremarkable.    PANCREAS: Unremarkable.    ADRENAL GLANDS: Stable left adrenal nodule measuring up to 1.3 cm. Right   adrenal gland within normal limits.    KIDNEYS: Symmetric pattern of renal enhancement. No hydronephrosis   bilaterally.    ABDOMINOPELVIC NODES: No lymphadenopathy.    PELVIC ORGANS: Unremarkable.    PERITONEUM/MESENTERY/BOWEL: No evidence of gastrointestinal arterial   extravasation or venous pooling to suggest active intestinal bleed. No   bowel obstruction. No ascites or pneumoperitoneum.    BONES/SOFT TISSUES: Postsurgical changes at the level the umbilicus and   inguinal regions bilaterally.    OTHER: Intervalplacement of biiliac stent graft compared to the prior   exam. Small portion of the stent is seen within the infrarenal portion   extending into the iliac arteries bilaterally. The native aortic aneurysm   measures 6.9 x 7.1 cm.    Patent celiac, SMA, bilateral renal branches. Atherosclerotic changes.    IMPRESSION:    No evidence of active GI extravasation.    Interval placement of an aorto biiliac stent graft.    Enhancing indeterminate left adrenal gland nodule, unchanged. Recommend   outpatient MRI or CT adrenal gland with and without contrast.    --- End of Report ---          AVA BOYER MD; Resident Radiologist  This document has been electronically signed.  SETH HIGH MD; Attending Radiologist  This document has been electronicallysigned. Jan 20 2025  8:37PM    < end of copied text >

## 2025-01-21 NOTE — PROGRESS NOTE ADULT - ASSESSMENT
#LGIB - had colonoscopy with Dr Narayanan as outpt - polypectomy and hemorrhoids seen   post polypectomy bleeding - resolved at this time  hgb mild drop  Hemoglobin: 8.9 g/dL (01-21 @ 09:18)  Hemoglobin: 9.5 g/dL (01-20 @ 21:19)  Hemoglobin: 9.9 g/dL (01-20 @ 18:49)  Hemoglobin: 10.5 g/dL (01-20 @ 16:41)    discussed with GI - cscope on 1/22 prep today  npo after mn  can hold asa - on for ppx - no stents    #DMII - on metformin at home - hold for now sp contrast   CAPILLARY BLOOD GLUCOSE      POCT Blood Glucose.: 108 mg/dL (21 Jan 2025 11:53)  POCT Blood Glucose.: 121 mg/dL (21 Jan 2025 08:09)  POCT Blood Glucose.: 122 mg/dL (20 Jan 2025 21:41)    #HTN - cont home meds    prep for dc home on 1/22 if cscope stable

## 2025-01-22 ENCOUNTER — TRANSCRIPTION ENCOUNTER (OUTPATIENT)
Age: 75
End: 2025-01-22

## 2025-01-22 ENCOUNTER — RESULT REVIEW (OUTPATIENT)
Age: 75
End: 2025-01-22

## 2025-01-22 VITALS
RESPIRATION RATE: 18 BRPM | DIASTOLIC BLOOD PRESSURE: 71 MMHG | OXYGEN SATURATION: 97 % | SYSTOLIC BLOOD PRESSURE: 129 MMHG | HEART RATE: 52 BPM | TEMPERATURE: 98 F

## 2025-01-22 LAB
GLUCOSE BLDC GLUCOMTR-MCNC: 128 MG/DL — HIGH (ref 70–99)
GLUCOSE BLDC GLUCOMTR-MCNC: 149 MG/DL — HIGH (ref 70–99)
HCT VFR BLD CALC: 28.2 % — LOW (ref 42–52)
HGB BLD-MCNC: 9.7 G/DL — LOW (ref 14–18)
MCHC RBC-ENTMCNC: 30 PG — SIGNIFICANT CHANGE UP (ref 27–31)
MCHC RBC-ENTMCNC: 34.4 G/DL — SIGNIFICANT CHANGE UP (ref 32–37)
MCV RBC AUTO: 87.3 FL — SIGNIFICANT CHANGE UP (ref 80–94)
NRBC # BLD: 0 /100 WBCS — SIGNIFICANT CHANGE UP (ref 0–0)
NRBC BLD-RTO: 0 /100 WBCS — SIGNIFICANT CHANGE UP (ref 0–0)
PLATELET # BLD AUTO: 86 K/UL — LOW (ref 130–400)
PMV BLD: 11.4 FL — HIGH (ref 7.4–10.4)
RBC # BLD: 3.23 M/UL — LOW (ref 4.7–6.1)
RBC # FLD: 12.7 % — SIGNIFICANT CHANGE UP (ref 11.5–14.5)
WBC # BLD: 6.58 K/UL — SIGNIFICANT CHANGE UP (ref 4.8–10.8)
WBC # FLD AUTO: 6.58 K/UL — SIGNIFICANT CHANGE UP (ref 4.8–10.8)

## 2025-01-22 PROCEDURE — 99239 HOSP IP/OBS DSCHRG MGMT >30: CPT

## 2025-01-22 PROCEDURE — 44391 COLONOSCOPY FOR BLEEDING: CPT | Mod: XU

## 2025-01-22 PROCEDURE — 45385 COLONOSCOPY W/LESION REMOVAL: CPT

## 2025-01-22 PROCEDURE — 88305 TISSUE EXAM BY PATHOLOGIST: CPT | Mod: 26

## 2025-01-22 RX ORDER — HYDRALAZINE HCL 100 MG
5 TABLET ORAL ONCE
Refills: 0 | Status: COMPLETED | OUTPATIENT
Start: 2025-01-22 | End: 2025-01-22

## 2025-01-22 RX ADMIN — Medication 5 MILLIGRAM(S): at 06:15

## 2025-01-22 RX ADMIN — PANTOPRAZOLE 40 MILLIGRAM(S): 20 TABLET, DELAYED RELEASE ORAL at 06:15

## 2025-01-22 RX ADMIN — Medication 5 MILLIGRAM(S): at 11:54

## 2025-01-22 NOTE — CHART NOTE - NSCHARTNOTEFT_GEN_A_CORE
Please refer to sunrise results section for Colonoscopy findings and recommendations  -spoke with and updated patient Please refer to sunrise results section for Colonoscopy findings and recommendations  -spoke with and updated patient  -performed post-op check with attending, patient doing well

## 2025-01-22 NOTE — DISCHARGE NOTE PROVIDER - HOSPITAL COURSE
Patient is 74-year-old male with hx of HTN, HLD, DM2, and colonic polyp presenting to the ED with blood in the stool.   Patient had a colonoscopy on the 15th.  Patient had 3 polyps removed and was told he had hemorrhoids.  Patient states last night he started to have bloody bowel movement.  Patient had 2 last night and 1 this morning.  Patient went to follow-up in his gastroenterologist office today and was told to come to the ED for further evaluation and admission    reports abdm pain this am, which resolved now.  afebrile.  Offers no other complaints  (20 Jan 2025 18:04)  #LGIB - had colonoscopy with Dr Narayanan as outpt - polypectomy and hemorrhoids seen   post polypectomy bleeding - resolved at this time  hgb mild drop  Hemoglobin: 8.9 g/dL (01-21 @ 09:18)  Hemoglobin: 9.5 g/dL (01-20 @ 21:19)  Hemoglobin: 9.9 g/dL (01-20 @ 18:49)  Hemoglobin: 10.5 g/dL (01-20 @ 16:41)    discussed with GI - cscope on 1/22 prep today  npo after mn  can hold asa - on for ppx - no stents    #DMII - on metformin at home - hold for now sp contrast   CAPILLARY BLOOD GLUCOSE      POCT Blood Glucose.: 108 mg/dL (21 Jan 2025 11:53)  POCT Blood Glucose.: 121 mg/dL (21 Jan 2025 08:09)  POCT Blood Glucose.: 122 mg/dL (20 Jan 2025 21:41)    #HTN - cont home meds< from: Colonoscopy (01.22.25 @ 15:00) >    Impressions:    15 mm linear ulcer with a visible vessel and stigmata of bleeding was seen at  a polypectomy site in the cecum (Injection, Endoclip).    An ulcerated area with a clip was seen in the ascending colon at a previous  polypectomy site (Endoclip).    A 10 mm cratered ulcer with a small visiblevessel was seen in the transverse  colon (Injection, Endoclip).    Polyp (6 mm) in the descending colon. (Polypectomy, Endoclip).    Mild diverticulosis of the whole colon.    Internal and external hemorrhoids.    The colon was otherwise unremarkable.    Plan:    Await pathology results.    High Fiber Diet.    Colonoscopy in 3 years with primary GI physician, (colon polyps).    < end of copied text >      discussed with Dr silva - GI - stable to dc home - follow up with dr narayanan

## 2025-01-22 NOTE — DISCHARGE NOTE PROVIDER - NSDCFUSCHEDAPPT_GEN_ALL_CORE_FT
[facility]  VASCULAR [address] [place] [address] A  Scheduled Appointment: 03/06/2025    Kulwant Salas  [facility]  VASCULAR [address] [place] [address] A  Scheduled Appointment: 03/06/2025

## 2025-01-22 NOTE — DISCHARGE NOTE NURSING/CASE MANAGEMENT/SOCIAL WORK - FINANCIAL ASSISTANCE
[facility] provides services at a reduced cost to those who are determined to be eligible through [facility]’s financial assistance program. Information regarding [facility]’s financial assistance program can be found by going to [email or web address] or by calling [phone].

## 2025-01-22 NOTE — DISCHARGE NOTE PROVIDER - NSDCMRMEDTOKEN_GEN_ALL_CORE_FT
acetaminophen 325 mg oral tablet: 2 tab(s) orally every 6 hours As needed Mild Pain (1 - 3)  enalapril 5 mg oral tablet: 1 tab(s) orally once a day  metFORMIN 500 mg oral tablet: 1 tab(s) orally 2 times a day  rosuvastatin 40 mg oral tablet: 1 tab(s) orally once a day

## 2025-01-22 NOTE — DISCHARGE NOTE NURSING/CASE MANAGEMENT/SOCIAL WORK - NSDCPEFALRISK_GEN_ALL_CORE
For information on Fall & Injury Prevention, visit: [email or web address] OR  [email or web address] OR  https://www.cdc.gov/steadi/patient.html

## 2025-01-22 NOTE — DISCHARGE NOTE PROVIDER - NSDCCPCAREPLAN_GEN_ALL_CORE_FT
PRINCIPAL DISCHARGE DIAGNOSIS  Diagnosis: Rectal bleed  Assessment and Plan of Treatment: after polypectomy from colonoscopy - repeat scope done that showed an ulceration with visible blod vessel that was cautherized and clipped, stop taking aspirin for now - monitor for further bleeding - call your GI doctor if any change  Impressions:  15 mm linear ulcer with a visible vessel and stigmata of bleeding was seen at  a polypectomy site in the cecum (Injection, Endoclip).  An ulcerated area with a clip was seen in the ascending colon at a previous  polypectomy site (Endoclip).  A 10 mm cratered ulcer with a small visiblevessel was seen in the transverse  colon (Injection, Endoclip).  Polyp (6 mm) in the descending colon. (Polypectomy, Endoclip).  Mild diverticulosis of the whole colon.  Internal and external hemorrhoids.  The colon was otherwise unremarkable.  Plan:  Await pathology results.  High Fiber Diet.  Colonoscopy in 3 years with primary GI physician, (colon polyps).      
no

## 2025-01-22 NOTE — CHART NOTE - NSCHARTNOTEFT_GEN_A_CORE
Received report that patient is having BRBPR, he has consumed about 75% of his Go-lytely. At this point will hold Go-lytely for a few hours and monitor and strongly consider resumption  then.  Repeat labs ordered fir the AM Received report that patient is having BRBPR, he has consumed about 75% of his Go-lytely. At this point will hold Go-lytely for a few hours and monitor and strongly consider resumption  then.  Repeat labs ordered fir the AM. Will also contact GI fellow

## 2025-01-22 NOTE — DISCHARGE NOTE PROVIDER - CARE PROVIDER_API CALL
Jcarlos Narayanan  Gastroenterology  [address], [address]  [place] [address]  Phone: [phone]  Fax: [phone]  Follow Up Time: 2 weeks

## 2025-01-22 NOTE — DISCHARGE NOTE NURSING/CASE MANAGEMENT/SOCIAL WORK - NSDCVIVACCINE_GEN_ALL_CORE_FT
No Vaccines Administered. Birth Control Pills Counseling: Birth Control Pill Counseling: I discussed with the patient the potential side effects of OCPs including but not limited to increased risk of stroke, heart attack, thrombophlebitis, deep venous thrombosis, hepatic adenomas, breast changes, GI upset, headaches, and depression.  The patient verbalized understanding of the proper use and possible adverse effects of OCPs. All of the patient's questions and concerns were addressed.

## 2025-01-22 NOTE — DISCHARGE NOTE NURSING/CASE MANAGEMENT/SOCIAL WORK - PATIENT PORTAL LINK FT
You can access the FollowMyHealth Patient Portal offered by [facility] by registering at the following website: [email or web address]. By joining [employer]’s FollowMyHealth portal, you will also be able to view your health information using other applications (apps) compatible with our system.

## 2025-01-23 LAB — SURGICAL PATHOLOGY STUDY: SIGNIFICANT CHANGE UP
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
## 2025-01-30 DIAGNOSIS — I10 ESSENTIAL (PRIMARY) HYPERTENSION: ICD-10-CM
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
## 2025-01-30 DIAGNOSIS — K57.33 DIVERTICULITIS OF LARGE INTESTINE WITHOUT PERFORATION OR ABSCESS WITH BLEEDING: ICD-10-CM

## 2025-01-30 DIAGNOSIS — K64.1 SECOND DEGREE HEMORRHOIDS: ICD-10-CM

## 2025-01-30 DIAGNOSIS — E11.29 TYPE 2 DIABETES MELLITUS WITH OTHER DIABETIC KIDNEY COMPLICATION: ICD-10-CM

## 2025-01-30 DIAGNOSIS — R55 SYNCOPE AND COLLAPSE: ICD-10-CM

## 2025-01-30 DIAGNOSIS — Z83.3 FAMILY HISTORY OF DIABETES MELLITUS: ICD-10-CM

## 2025-01-30 DIAGNOSIS — K91.840 POSTPROCEDURAL HEMORRHAGE OF A DIGESTIVE SYSTEM ORGAN OR STRUCTURE FOLLOWING A DIGESTIVE SYSTEM PROCEDURE: ICD-10-CM

## 2025-01-30 DIAGNOSIS — K64.4 RESIDUAL HEMORRHOIDAL SKIN TAGS: ICD-10-CM

## 2025-01-30 DIAGNOSIS — Z79.84 LONG TERM (CURRENT) USE OF ORAL HYPOGLYCEMIC DRUGS: ICD-10-CM

## 2025-01-30 DIAGNOSIS — Z79.82 LONG TERM (CURRENT) USE OF ASPIRIN: ICD-10-CM

## 2025-01-30 DIAGNOSIS — Z86.79 PERSONAL HISTORY OF OTHER DISEASES OF THE CIRCULATORY SYSTEM: ICD-10-CM

## 2025-01-30 DIAGNOSIS — D64.9 ANEMIA, UNSPECIFIED: ICD-10-CM

## 2025-01-30 DIAGNOSIS — E04.1 NONTOXIC SINGLE THYROID NODULE: ICD-10-CM

## 2025-01-30 DIAGNOSIS — R71.0 PRECIPITOUS DROP IN HEMATOCRIT: ICD-10-CM

## 2025-01-30 DIAGNOSIS — E78.00 PURE HYPERCHOLESTEROLEMIA, UNSPECIFIED: ICD-10-CM

## 2025-01-30 DIAGNOSIS — K63.3 ULCER OF INTESTINE: ICD-10-CM

## 2025-01-30 DIAGNOSIS — K63.5 POLYP OF COLON: ICD-10-CM

## 2025-03-06 ENCOUNTER — APPOINTMENT (OUTPATIENT)
Dept: VASCULAR SURGERY | Facility: CLINIC | Age: 75
End: 2025-03-06
Payer: MEDICARE

## 2025-03-06 ENCOUNTER — NON-APPOINTMENT (OUTPATIENT)
Age: 75
End: 2025-03-06

## 2025-03-06 VITALS — HEART RATE: 56 BPM | DIASTOLIC BLOOD PRESSURE: 63 MMHG | SYSTOLIC BLOOD PRESSURE: 113 MMHG

## 2025-03-06 VITALS — HEIGHT: 68 IN | BODY MASS INDEX: 25.76 KG/M2 | WEIGHT: 170 LBS

## 2025-03-06 DIAGNOSIS — I71.43 INFRARENAL ABDOMINAL AORTIC ANEURYSM, WITHOUT RUPTURE: ICD-10-CM

## 2025-03-06 PROCEDURE — 99213 OFFICE O/P EST LOW 20 MIN: CPT

## 2025-03-06 PROCEDURE — 93978 VASCULAR STUDY: CPT

## 2025-03-06 RX ORDER — ENALAPRIL MALEATE 5 MG/1
TABLET ORAL
Refills: 0 | Status: ACTIVE | COMMUNITY

## 2025-03-06 RX ORDER — ROSUVASTATIN CALCIUM 5 MG/1
TABLET, FILM COATED ORAL
Refills: 0 | Status: ACTIVE | COMMUNITY

## 2025-03-06 RX ORDER — METFORMIN HYDROCHLORIDE 500 MG/1
500 TABLET, COATED ORAL
Refills: 0 | Status: ACTIVE | COMMUNITY

## 2025-06-23 NOTE — DISCHARGE NOTE NURSING/CASE MANAGEMENT/SOCIAL WORK - NSFLUVACAGEDISCH_IMM_ALL_CORE
Next appointment 7/11/25   LOV 4/11/25   A prescription for duloxetine will be provided, starting with 30 mg daily for 2 weeks, then increasing to 60 mg daily.   Last refill 4/11/25 #60 caps, 1 refill      Adult

## 2025-09-04 ENCOUNTER — APPOINTMENT (OUTPATIENT)
Dept: VASCULAR SURGERY | Facility: CLINIC | Age: 75
End: 2025-09-04
Payer: MEDICARE

## 2025-09-04 VITALS — SYSTOLIC BLOOD PRESSURE: 129 MMHG | DIASTOLIC BLOOD PRESSURE: 76 MMHG

## 2025-09-04 VITALS — WEIGHT: 170 LBS | HEIGHT: 68 IN | BODY MASS INDEX: 25.76 KG/M2

## 2025-09-04 DIAGNOSIS — I71.43 INFRARENAL ABDOMINAL AORTIC ANEURYSM, WITHOUT RUPTURE: ICD-10-CM

## 2025-09-04 PROCEDURE — 99213 OFFICE O/P EST LOW 20 MIN: CPT

## 2025-09-04 PROCEDURE — 93978 VASCULAR STUDY: CPT
